# Patient Record
Sex: MALE | Race: WHITE | NOT HISPANIC OR LATINO | Employment: FULL TIME | URBAN - METROPOLITAN AREA
[De-identification: names, ages, dates, MRNs, and addresses within clinical notes are randomized per-mention and may not be internally consistent; named-entity substitution may affect disease eponyms.]

---

## 2018-12-13 ENCOUNTER — OFFICE VISIT (OUTPATIENT)
Dept: URGENT CARE | Facility: CLINIC | Age: 43
End: 2018-12-13
Payer: COMMERCIAL

## 2018-12-13 VITALS
TEMPERATURE: 98 F | OXYGEN SATURATION: 98 % | WEIGHT: 178 LBS | RESPIRATION RATE: 18 BRPM | HEART RATE: 75 BPM | HEIGHT: 69 IN | SYSTOLIC BLOOD PRESSURE: 142 MMHG | DIASTOLIC BLOOD PRESSURE: 83 MMHG | BODY MASS INDEX: 26.36 KG/M2

## 2018-12-13 DIAGNOSIS — R03.0 ELEVATED BP WITHOUT DIAGNOSIS OF HYPERTENSION: ICD-10-CM

## 2018-12-13 DIAGNOSIS — R09.82 POST-NASAL DRIP: ICD-10-CM

## 2018-12-13 DIAGNOSIS — J06.9 VIRAL URI WITH COUGH: Primary | ICD-10-CM

## 2018-12-13 DIAGNOSIS — S76.212A GROIN STRAIN, LEFT, INITIAL ENCOUNTER: ICD-10-CM

## 2018-12-13 DIAGNOSIS — Z72.0 TOBACCO ABUSE: ICD-10-CM

## 2018-12-13 PROCEDURE — 99203 OFFICE O/P NEW LOW 30 MIN: CPT | Mod: S$GLB,,, | Performed by: NURSE PRACTITIONER

## 2018-12-13 PROCEDURE — 3008F BODY MASS INDEX DOCD: CPT | Mod: CPTII,S$GLB,, | Performed by: NURSE PRACTITIONER

## 2018-12-13 RX ORDER — ETODOLAC 400 MG/1
400 TABLET, FILM COATED ORAL 2 TIMES DAILY
Qty: 30 TABLET | Refills: 0 | Status: SHIPPED | OUTPATIENT
Start: 2018-12-13 | End: 2018-12-28

## 2018-12-13 RX ORDER — AZELASTINE 1 MG/ML
2 SPRAY, METERED NASAL 2 TIMES DAILY
Qty: 30 ML | Refills: 0 | Status: SHIPPED | OUTPATIENT
Start: 2018-12-13 | End: 2019-04-01

## 2018-12-13 RX ORDER — PREDNISONE 20 MG/1
20 TABLET ORAL DAILY
Qty: 5 TABLET | Refills: 0 | Status: SHIPPED | OUTPATIENT
Start: 2018-12-13 | End: 2018-12-18

## 2018-12-13 RX ORDER — LORATADINE 10 MG/1
10 TABLET ORAL DAILY
Qty: 30 TABLET | Refills: 0 | Status: SHIPPED | OUTPATIENT
Start: 2018-12-13 | End: 2019-07-11

## 2018-12-13 NOTE — PATIENT INSTRUCTIONS
Please drink plenty of fluids.  Please get plenty of rest.  Please return here or go to the Emergency Department for any concerns or worsening of condition.  If you were prescribed antibiotics, please take them to completion.  If you were given wait & see antibiotics, please wait 5-7 days before taking them, and only take them if your symptoms have worsened or not improved.  If you do begin taking the antibiotics, please take them to completion.  If you were prescribed a narcotic medication, do not drive or operate heavy equipment or machinery while taking these medications.  If you were given a steroid shot in the clinic and have also been given a prescription for a steroid such as Prednisone or a Medrol Dose Pack, please begin taking them tomorrow.  If you do not have Hypertension or any history of palpitations, it is ok to take over the counter Sudafed or Mucinex D or Allegra-D or Claritin-D or Zyrtec-D.  If you do take one of the above, it is ok to combine that with plain over the counter Mucinex or Allegra or Claritin or Zyrtec.  If for example you are taking Zyrtec -D, you can combine that with Mucinex, but not Mucinex-D.  If you are taking Mucinex-D, you can combine that with plain Allegra or Claritin or Zyrtec.   If you do have Hypertension or palpitations, it is safe to take Coricidin HBP for relief of sinus symptoms.  If not allergic, please take over the counter Tylenol (Acetaminophen) and/or Motrin (Ibuprofen) as directed for control of pain and/or fever.  Please follow up with your primary care doctor or specialist as needed.    If you  smoke, please stop smoking.    Muscle Strain in the Extremities  A muscle strain is a stretching and tearing of muscle fibers. This causes pain, especially when you move that muscle. There may also be some swelling and bruising.  Home care  · Keep the hurt area raised to reduce pain and swelling. This is especially important during the first 48 hours.  · Apply an ice  pack over the injured area for 15 to 20 minutes every 3 to 6 hours. You should do this for the first 24 to 48 hours. You can make an ice pack by filling a plastic bag that seals at the top with ice cubes and then wrapping it with a thin towel. Be careful not to injure your skin with the ice treatments. Ice should never be applied directly to skin. Continue the use of ice packs for relief of pain and swelling as needed. After 48 hours, apply heat (warm shower or warm bath) for 15 to 20 minutes several times a day, or alternate ice and heat.  · You may use over-the-counter pain medicine to control pain, unless another medicine was prescribed. If you have chronic liver or kidney disease or ever had a stomach ulcer or GI bleeding, talk with your healthcare provider before using these medicines.  · For leg strains: If crutches have been recommended, dont put full weight on the hurt leg until you can do so without pain. You can return to sports when you are able to hop and run on the injured leg without pain.  Follow-up care  Follow up with your healthcare provider, or as advised.  When to seek medical advice  Call your healthcare provider right away if any of these occur:  · The toes of the injured leg become swollen, cold, blue, numb, or tingly  · Pain or swelling increases  Date Last Reviewed: 11/19/2015 © 2000-2017 [employer]. [address] [place] [address]. All rights reserved. This information is not intended as a substitute for professional medical care. Always follow your healthcare professional's instructions.        Viral Upper Respiratory Illness (Adult)  You have a viral upper respiratory illness (URI), which is another term for the common cold. This illness is contagious during the first few days. It is spread through the air by coughing and sneezing. It may also be spread by direct contact (touching the sick person and then touching your own eyes, nose, or mouth). Frequent  handwashing will decrease risk of spread. Most viral illnesses go away within 7 to 10 days with rest and simple home remedies. Sometimes the illness may last for several weeks. Antibiotics will not kill a virus, and they are generally not prescribed for this condition.    Home care  · If symptoms are severe, rest at home for the first 2 to 3 days. When you resume activity, don't let yourself get too tired.  · Avoid being exposed to cigarette smoke (yours or others).  · You may use acetaminophen or ibuprofen to control pain and fever, unless another medicine was prescribed. (Note: If you have chronic liver or kidney disease, have ever had a stomach ulcer or gastrointestinal bleeding, or are taking blood-thinning medicines, talk with your healthcare provider before using these medicines.) Aspirin should never be given to anyone under 18 years of age who is ill with a viral infection or fever. It may cause severe liver or brain damage.  · Your appetite may be poor, so a light diet is fine. Avoid dehydration by drinking 6 to 8 glasses of fluids per day (water, soft drinks, juices, tea, or soup). Extra fluids will help loosen secretions in the nose and lungs.  · Over-the-counter cold medicines will not shorten the length of time youre sick, but they may be helpful for the following symptoms: cough, sore throat, and nasal and sinus congestion. (Note: Do not use decongestants if you have high blood pressure.)  Follow-up care  Follow up with your healthcare provider, or as advised.  When to seek medical advice  Call your healthcare provider right away if any of these occur:  · Cough with lots of colored sputum (mucus)  · Severe headache; face, neck, or ear pain  · Difficulty swallowing due to throat pain  · Fever of 100.4°F (38°C)  Call 911, or get immediate medical care  Call emergency services right away if any of these occur:  · Chest pain, shortness of breath, wheezing, or difficulty breathing  · Coughing up  blood  · Inability to swallow due to throat pain  Date Last Reviewed: 9/13/2015  © 5824-8681 [employer]. [address] [place] [address], [place] [address]. All rights reserved. This information is not intended as a substitute for professional medical care. Always follow your healthcare professional's instructions.

## 2018-12-13 NOTE — PROGRESS NOTES
"Subjective:       Patient ID: Raoul Logan is a 43 y.o. male.    Vitals:  height is 5' 9" (1.753 m) and weight is 80.7 kg (178 lb). His temperature is 97.7 °F (36.5 °C). His blood pressure is 142/83 (abnormal) and his pulse is 75. His respiration is 18 and oxygen saturation is 98%.     Chief Complaint: URI    Pt also C/o having Lt hip pain * 3 weeks limited ROM.  Patient has been having URI symptoms for the last few days.  Has not taken anything for his hip or his URI symptoms.  Mother is also ill with the same thing.      URI    This is a new problem. The current episode started in the past 7 days (4 days). The problem has been unchanged. There has been no fever. Associated symptoms include congestion, coughing, ear pain, a plugged ear sensation, rhinorrhea and sinus pain. Pertinent negatives include no nausea, rash, sore throat, vomiting or wheezing. He has tried nothing for the symptoms. The treatment provided no relief.       Constitution: Negative for chills, sweating, fatigue and fever.   HENT: Positive for ear pain, tinnitus, congestion, sinus pain and sinus pressure. Negative for sore throat and voice change.    Neck: Negative for painful lymph nodes.   Eyes: Negative for eye redness.   Respiratory: Positive for cough and sputum production. Negative for chest tightness, bloody sputum, COPD, shortness of breath, stridor, wheezing and asthma.    Gastrointestinal: Negative for nausea and vomiting.   Musculoskeletal: Positive for joint pain and abnormal ROM of joint. Negative for muscle ache.   Skin: Negative for rash.   Allergic/Immunologic: Negative for seasonal allergies and asthma.   Hematologic/Lymphatic: Negative for swollen lymph nodes.       Objective:      Physical Exam   Constitutional: He is oriented to person, place, and time. He appears well-developed and well-nourished. He is cooperative.  Non-toxic appearance. He does not have a sickly appearance. He does not appear ill. No distress. "   Elevated BP   HENT:   Head: Normocephalic and atraumatic.   Right Ear: Hearing, external ear and ear canal normal. A middle ear effusion is present.   Left Ear: Hearing, external ear and ear canal normal. A middle ear effusion is present.   Nose: Mucosal edema and rhinorrhea present. No nasal deformity. No epistaxis. Right sinus exhibits no maxillary sinus tenderness and no frontal sinus tenderness. Left sinus exhibits no maxillary sinus tenderness and no frontal sinus tenderness.   Mouth/Throat: Uvula is midline and mucous membranes are normal. No trismus in the jaw. Normal dentition. No uvula swelling. Posterior oropharyngeal erythema present. Tonsils are 2+ on the right. Tonsils are 2+ on the left. No tonsillar exudate.   Eyes: EOM and lids are normal. Pupils are equal, round, and reactive to light. Right conjunctiva is injected. Left conjunctiva is injected. No scleral icterus.   Sclera clear bilat   Neck: Trachea normal, normal range of motion, full passive range of motion without pain and phonation normal. Neck supple. No spinous process tenderness and no muscular tenderness present. No neck rigidity. Normal range of motion present.   Cardiovascular: Normal rate, regular rhythm, normal heart sounds and normal pulses.   Pulmonary/Chest: Effort normal and breath sounds normal. No respiratory distress.   Abdominal: Normal appearance.   Musculoskeletal: He exhibits tenderness. He exhibits no edema or deformity.        Left hip: He exhibits tenderness. He exhibits normal range of motion and normal strength.        Left knee: Normal.        Left upper leg: Normal.        Legs:  Lymphadenopathy:     He has cervical adenopathy.   Neurological: He is alert and oriented to person, place, and time. He exhibits normal muscle tone. Coordination normal.   Skin: Skin is warm, dry and intact. Capillary refill takes less than 2 seconds. He is not diaphoretic. No pallor.   Psychiatric: He has a normal mood and affect. His  speech is normal and behavior is normal. Judgment and thought content normal. Cognition and memory are normal.   Nursing note and vitals reviewed.      Assessment:       1. Viral URI with cough    2. Groin strain, left, initial encounter    3. Post-nasal drip    4. Tobacco abuse    5. Elevated BP without diagnosis of hypertension        Plan:         Viral URI with cough  -     loratadine (CLARITIN) 10 mg tablet; Take 1 tablet (10 mg total) by mouth once daily.  Dispense: 30 tablet; Refill: 0  -     azelastine (ASTELIN) 137 mcg (0.1 %) nasal spray; 2 sprays (274 mcg total) by Nasal route 2 (two) times daily.  Dispense: 30 mL; Refill: 0  -     predniSONE (DELTASONE) 20 MG tablet; Take 1 tablet (20 mg total) by mouth once daily. for 5 days  Dispense: 5 tablet; Refill: 0    Groin strain, left, initial encounter  -     etodolac (LODINE) 400 MG tablet; Take 1 tablet (400 mg total) by mouth 2 (two) times daily. for 15 days  Dispense: 30 tablet; Refill: 0    Post-nasal drip  -     loratadine (CLARITIN) 10 mg tablet; Take 1 tablet (10 mg total) by mouth once daily.  Dispense: 30 tablet; Refill: 0  -     azelastine (ASTELIN) 137 mcg (0.1 %) nasal spray; 2 sprays (274 mcg total) by Nasal route 2 (two) times daily.  Dispense: 30 mL; Refill: 0  -     predniSONE (DELTASONE) 20 MG tablet; Take 1 tablet (20 mg total) by mouth once daily. for 5 days  Dispense: 5 tablet; Refill: 0    Tobacco abuse  -     Ambulatory referral to Smoking Cessation Program    Elevated BP without diagnosis of hypertension          Patient Instructions     Please drink plenty of fluids.  Please get plenty of rest.  Please return here or go to the Emergency Department for any concerns or worsening of condition.  If you were prescribed antibiotics, please take them to completion.  If you were given wait & see antibiotics, please wait 5-7 days before taking them, and only take them if your symptoms have worsened or not improved.  If you do begin taking the  antibiotics, please take them to completion.  If you were prescribed a narcotic medication, do not drive or operate heavy equipment or machinery while taking these medications.  If you were given a steroid shot in the clinic and have also been given a prescription for a steroid such as Prednisone or a Medrol Dose Pack, please begin taking them tomorrow.  If you do not have Hypertension or any history of palpitations, it is ok to take over the counter Sudafed or Mucinex D or Allegra-D or Claritin-D or Zyrtec-D.  If you do take one of the above, it is ok to combine that with plain over the counter Mucinex or Allegra or Claritin or Zyrtec.  If for example you are taking Zyrtec -D, you can combine that with Mucinex, but not Mucinex-D.  If you are taking Mucinex-D, you can combine that with plain Allegra or Claritin or Zyrtec.   If you do have Hypertension or palpitations, it is safe to take Coricidin HBP for relief of sinus symptoms.  If not allergic, please take over the counter Tylenol (Acetaminophen) and/or Motrin (Ibuprofen) as directed for control of pain and/or fever.  Please follow up with your primary care doctor or specialist as needed.    If you  smoke, please stop smoking.    Muscle Strain in the Extremities  A muscle strain is a stretching and tearing of muscle fibers. This causes pain, especially when you move that muscle. There may also be some swelling and bruising.  Home care  · Keep the hurt area raised to reduce pain and swelling. This is especially important during the first 48 hours.  · Apply an ice pack over the injured area for 15 to 20 minutes every 3 to 6 hours. You should do this for the first 24 to 48 hours. You can make an ice pack by filling a plastic bag that seals at the top with ice cubes and then wrapping it with a thin towel. Be careful not to injure your skin with the ice treatments. Ice should never be applied directly to skin. Continue the use of ice packs for relief of pain and  swelling as needed. After 48 hours, apply heat (warm shower or warm bath) for 15 to 20 minutes several times a day, or alternate ice and heat.  · You may use over-the-counter pain medicine to control pain, unless another medicine was prescribed. If you have chronic liver or kidney disease or ever had a stomach ulcer or GI bleeding, talk with your healthcare provider before using these medicines.  · For leg strains: If crutches have been recommended, dont put full weight on the hurt leg until you can do so without pain. You can return to sports when you are able to hop and run on the injured leg without pain.  Follow-up care  Follow up with your healthcare provider, or as advised.  When to seek medical advice  Call your healthcare provider right away if any of these occur:  · The toes of the injured leg become swollen, cold, blue, numb, or tingly  · Pain or swelling increases  Date Last Reviewed: 11/19/2015  © 0988-2902 [employer]. [address] [place] [address]. All rights reserved. This information is not intended as a substitute for professional medical care. Always follow your healthcare professional's instructions.        Viral Upper Respiratory Illness (Adult)  You have a viral upper respiratory illness (URI), which is another term for the common cold. This illness is contagious during the first few days. It is spread through the air by coughing and sneezing. It may also be spread by direct contact (touching the sick person and then touching your own eyes, nose, or mouth). Frequent handwashing will decrease risk of spread. Most viral illnesses go away within 7 to 10 days with rest and simple home remedies. Sometimes the illness may last for several weeks. Antibiotics will not kill a virus, and they are generally not prescribed for this condition.    Home care  · If symptoms are severe, rest at home for the first 2 to 3 days. When you resume activity, don't let yourself get too  tired.  · Avoid being exposed to cigarette smoke (yours or others).  · You may use acetaminophen or ibuprofen to control pain and fever, unless another medicine was prescribed. (Note: If you have chronic liver or kidney disease, have ever had a stomach ulcer or gastrointestinal bleeding, or are taking blood-thinning medicines, talk with your healthcare provider before using these medicines.) Aspirin should never be given to anyone under 18 years of age who is ill with a viral infection or fever. It may cause severe liver or brain damage.  · Your appetite may be poor, so a light diet is fine. Avoid dehydration by drinking 6 to 8 glasses of fluids per day (water, soft drinks, juices, tea, or soup). Extra fluids will help loosen secretions in the nose and lungs.  · Over-the-counter cold medicines will not shorten the length of time youre sick, but they may be helpful for the following symptoms: cough, sore throat, and nasal and sinus congestion. (Note: Do not use decongestants if you have high blood pressure.)  Follow-up care  Follow up with your healthcare provider, or as advised.  When to seek medical advice  Call your healthcare provider right away if any of these occur:  · Cough with lots of colored sputum (mucus)  · Severe headache; face, neck, or ear pain  · Difficulty swallowing due to throat pain  · Fever of 100.4°F (38°C)  Call 911, or get immediate medical care  Call emergency services right away if any of these occur:  · Chest pain, shortness of breath, wheezing, or difficulty breathing  · Coughing up blood  · Inability to swallow due to throat pain  Date Last Reviewed: 9/13/2015 © 2000-2017 [employer]. [address] [place] [address], [place] [address]. All rights reserved. This information is not intended as a substitute for professional medical care. Always follow your healthcare professional's instructions.

## 2019-01-11 ENCOUNTER — HOSPITAL ENCOUNTER (EMERGENCY)
Facility: HOSPITAL | Age: 44
Discharge: HOME | End: 2019-01-12
Attending: EMERGENCY MEDICINE
Payer: COMMERCIAL

## 2019-01-11 DIAGNOSIS — R52 PAIN: ICD-10-CM

## 2019-01-11 DIAGNOSIS — M25.552 LEFT HIP PAIN: Primary | ICD-10-CM

## 2019-01-11 LAB
ALBUMIN SERPL BCP-MCNC: 4.2 G/DL
ALP SERPL-CCNC: 104 U/L
ALT SERPL W/O P-5'-P-CCNC: 38 U/L
ANION GAP SERPL CALC-SCNC: 11 MMOL/L
AST SERPL-CCNC: 28 U/L
BASOPHILS # BLD AUTO: 0.01 K/UL
BASOPHILS NFR BLD: 0.1 %
BILIRUB SERPL-MCNC: 0.5 MG/DL
BILIRUB UR QL STRIP: NEGATIVE
BUN SERPL-MCNC: 20 MG/DL
CALCIUM SERPL-MCNC: 9.7 MG/DL
CHLORIDE SERPL-SCNC: 106 MMOL/L
CLARITY UR: CLEAR
CO2 SERPL-SCNC: 21 MMOL/L
COLOR UR: YELLOW
CREAT SERPL-MCNC: 0.9 MG/DL
DIFFERENTIAL METHOD: NORMAL
EOSINOPHIL # BLD AUTO: 0.3 K/UL
EOSINOPHIL NFR BLD: 3.6 %
ERYTHROCYTE [DISTWIDTH] IN BLOOD BY AUTOMATED COUNT: 13.8 %
EST. GFR  (AFRICAN AMERICAN): >60 ML/MIN/1.73 M^2
EST. GFR  (NON AFRICAN AMERICAN): >60 ML/MIN/1.73 M^2
GLUCOSE SERPL-MCNC: 87 MG/DL
GLUCOSE UR QL STRIP: NEGATIVE
HCT VFR BLD AUTO: 42.9 %
HGB BLD-MCNC: 14 G/DL
HGB UR QL STRIP: ABNORMAL
KETONES UR QL STRIP: NEGATIVE
LEUKOCYTE ESTERASE UR QL STRIP: NEGATIVE
LYMPHOCYTES # BLD AUTO: 2.3 K/UL
LYMPHOCYTES NFR BLD: 28.7 %
MCH RBC QN AUTO: 28.5 PG
MCHC RBC AUTO-ENTMCNC: 32.6 G/DL
MCV RBC AUTO: 87 FL
MONOCYTES # BLD AUTO: 0.5 K/UL
MONOCYTES NFR BLD: 6.4 %
NEUTROPHILS # BLD AUTO: 4.9 K/UL
NEUTROPHILS NFR BLD: 60.8 %
NITRITE UR QL STRIP: NEGATIVE
PH UR STRIP: 6 [PH] (ref 5–8)
PLATELET # BLD AUTO: 219 K/UL
PMV BLD AUTO: 10.2 FL
POTASSIUM SERPL-SCNC: 3.9 MMOL/L
PROT SERPL-MCNC: 7.9 G/DL
PROT UR QL STRIP: NEGATIVE
RBC # BLD AUTO: 4.92 M/UL
SODIUM SERPL-SCNC: 138 MMOL/L
SP GR UR STRIP: 1.01 (ref 1–1.03)
URN SPEC COLLECT METH UR: ABNORMAL
UROBILINOGEN UR STRIP-ACNC: NEGATIVE EU/DL
WBC # BLD AUTO: 8.01 K/UL

## 2019-01-11 PROCEDURE — 81003 URINALYSIS AUTO W/O SCOPE: CPT

## 2019-01-11 PROCEDURE — 96372 THER/PROPH/DIAG INJ SC/IM: CPT | Mod: 59

## 2019-01-11 PROCEDURE — 85025 COMPLETE CBC W/AUTO DIFF WBC: CPT

## 2019-01-11 PROCEDURE — 99284 EMERGENCY DEPT VISIT MOD MDM: CPT | Mod: 25

## 2019-01-11 PROCEDURE — 80053 COMPREHEN METABOLIC PANEL: CPT

## 2019-01-11 RX ADMIN — IOHEXOL 75 ML: 350 INJECTION, SOLUTION INTRAVENOUS at 11:01

## 2019-01-12 VITALS
RESPIRATION RATE: 16 BRPM | HEART RATE: 79 BPM | BODY MASS INDEX: 26.66 KG/M2 | HEIGHT: 69 IN | SYSTOLIC BLOOD PRESSURE: 141 MMHG | TEMPERATURE: 98 F | OXYGEN SATURATION: 100 % | DIASTOLIC BLOOD PRESSURE: 97 MMHG | WEIGHT: 180 LBS

## 2019-01-12 PROCEDURE — 63600175 PHARM REV CODE 636 W HCPCS: Performed by: EMERGENCY MEDICINE

## 2019-01-12 PROCEDURE — 25500020 PHARM REV CODE 255: Performed by: EMERGENCY MEDICINE

## 2019-01-12 RX ORDER — DEXAMETHASONE SODIUM PHOSPHATE 4 MG/ML
8 INJECTION, SOLUTION INTRA-ARTICULAR; INTRALESIONAL; INTRAMUSCULAR; INTRAVENOUS; SOFT TISSUE
Status: COMPLETED | OUTPATIENT
Start: 2019-01-12 | End: 2019-01-12

## 2019-01-12 RX ORDER — METHOCARBAMOL 500 MG/1
1000 TABLET, FILM COATED ORAL 3 TIMES DAILY
Qty: 30 TABLET | Refills: 0 | Status: SHIPPED | OUTPATIENT
Start: 2019-01-12 | End: 2019-01-17

## 2019-01-12 RX ORDER — NAPROXEN 500 MG/1
500 TABLET ORAL 2 TIMES DAILY WITH MEALS
Qty: 30 TABLET | Refills: 0 | Status: SHIPPED | OUTPATIENT
Start: 2019-01-12 | End: 2019-03-11

## 2019-01-12 RX ADMIN — DEXAMETHASONE SODIUM PHOSPHATE 8 MG: 4 INJECTION, SOLUTION INTRA-ARTICULAR; INTRALESIONAL; INTRAMUSCULAR; INTRAVENOUS; SOFT TISSUE at 01:01

## 2019-01-12 NOTE — ED TRIAGE NOTES
Patient presents to the ED with reports of having left sided hip pain that radiates to the groin. States pain started approximately x 3 months ago. States pain is intermittent. Denies any fall or trauma. Patient also complains of having intermittent bouts of rectal bleeding with diarrhea that started x 1 year ago. Patient states having bleeding x 3 days. Denies any nausea, vomiting, fever, or abdominal pain.     Review of patient's allergies indicates:   Allergen Reactions    Pcn [penicillins] Anaphylaxis        Patient has verified the spelling of their name and  on armband.   APPEARANCE: Patient is alert, calm, oriented x 4, and does not appear distressed.  SKIN: Skin is normal for race, warm, and dry. Normal skin turgor and mucous membranes moist.  CARDIAC: Normal rate and no murmur heard.   RESPIRATORY:Normal rate and effort. Breath sounds clear bilaterally throughout chest. Respirations are equal and unlabored.    GASTRO: Bowel sounds normal, abdomen is soft, no tenderness, and no abdominal distention. +Rectal bleeding.   MUSCLE: Full ROM. Left sided hip pain that radiates to the left inner groin area. No obvious deformity.  PERIPHERAL VASCULAR: peripheral pulses present. Normal cap refill. No edema. Warm to touch.  MENTAL STATUS: awake, alert and aware of environment.  : WNL

## 2019-01-12 NOTE — ED PROVIDER NOTES
"Encounter Date: 1/11/2019    SCRIBE #1 NOTE: I, Debbi Laguna, am scribing for, and in the presence of,  Dr. Eid. I have scribed the entire note.       History     Chief Complaint   Patient presents with    Hip Pain     left side and radiating to right side; x3 months; was seen at urgent care and given anti inflammatory with no relief    Rectal Bleeding     x2 days; BRB; accompanied by diarrhea; x1 year intermittent; happens "every 3 months or so"     Raoul Logan is a 43 y.o. male who  has no past medical history on file.    The patient presents to the ED due to left hip pain. He reports onset of symptoms was 3 months ago. The patient states the pain radiates to the groin. He notes about 3 weeks ago he was seen at [facility] for the pain and diagnosed with "groin pull". The patient was given antiinflammatories which have not improved the pain. He also notes he is starting to have pain in the right hip that he attributes to favoring right leg when walking. The patient denies any precipitating trauma or injury to the hip. He denies any associated swelling, numbness, tingling or weakness in the left leg.   The patient also complains of rectal bleeding. He reports onset of symptoms was about 1 year ago. The patient notes the symptoms have been intermittent since onset. He states he usually has rectal bleeding with diarrhea every 3 months. With his most recent episode of symptoms beginning 2 days ago. The patient reports the blood is bright red and mixed with loose stool. He denies any associated rectal pain or abdominal pain. The patient denies any prior GI evaluations for symptoms.       The history is provided by the patient.     Review of patient's allergies indicates:   Allergen Reactions    Pcn [penicillins] Anaphylaxis     History reviewed. No pertinent past medical history.  History reviewed. No pertinent surgical history.  Family History   Problem Relation Age of Onset    Cancer Mother     No Known " Problems Father      Social History     Tobacco Use    Smoking status: Current Every Day Smoker    Smokeless tobacco: Never Used   Substance Use Topics    Alcohol use: No     Frequency: Never    Drug use: No     Review of Systems   Constitutional: Negative for chills and fever.   HENT: Negative for congestion, ear pain, rhinorrhea and sore throat.    Respiratory: Negative for cough, shortness of breath and wheezing.    Cardiovascular: Negative for chest pain and palpitations.   Gastrointestinal: Positive for anal bleeding. Negative for abdominal pain, diarrhea, nausea and vomiting.   Genitourinary: Negative for dysuria and hematuria.   Musculoskeletal: Negative for back pain, myalgias and neck pain.        Positive for left hip pain   Skin: Negative for rash.   Neurological: Negative for dizziness, weakness, light-headedness and headaches.   Psychiatric/Behavioral: Negative for confusion.       Physical Exam     Initial Vitals [01/11/19 2157]   BP Pulse Resp Temp SpO2   (!) 144/91 81 16 97 °F (36.1 °C) 100 %      MAP       --         Physical Exam    Nursing note and vitals reviewed.  Constitutional: He appears well-developed and well-nourished. He is not diaphoretic. No distress.   HENT:   Head: Normocephalic and atraumatic.   Mouth/Throat: Oropharynx is clear and moist.   Eyes: Conjunctivae and EOM are normal.   Neck: Normal range of motion. Neck supple.   Cardiovascular: Normal rate, regular rhythm and normal heart sounds. Exam reveals no gallop and no friction rub.    No murmur heard.  Pulmonary/Chest: Breath sounds normal. He has no wheezes. He has no rhonchi. He has no rales.   Abdominal: Soft. There is no tenderness. There is no rebound and no guarding.   Genitourinary:   Genitourinary Comments: Tenderness to left groin. No inguinal adenopathy. No palpable inguinal hernia. No erythema to the groin   Musculoskeletal: Normal range of motion. He exhibits tenderness. He exhibits no edema.   Lymphadenopathy:      He has no cervical adenopathy.   Neurological: He is alert and oriented to person, place, and time. He has normal strength.   Skin: Skin is warm and dry. No rash noted.         ED Course   Procedures  Labs Reviewed   COMPREHENSIVE METABOLIC PANEL - Abnormal; Notable for the following components:       Result Value    CO2 21 (*)     All other components within normal limits   URINALYSIS, REFLEX TO URINE CULTURE - Abnormal; Notable for the following components:    Occult Blood UA Trace (*)     All other components within normal limits    Narrative:     Preferred Collection Type->Urine, Clean Catch   CBC W/ AUTO DIFFERENTIAL          Imaging Results          CT Abdomen Pelvis With Contrast (Final result)  Result time 01/12/19 00:08:07    Final result by Paola Amaral MD (01/12/19 00:08:07)                 Impression:      2.2 cm low-attenuation left adrenal cyst lesion, which is indeterminate.  If warranted, consider MRI imaging with inphase and out of phase sequencing.    L5/S1 spondylolysis and spondylolisthesis.      Electronically signed by: Paola Amaral  Date:    01/12/2019  Time:    00:08             Narrative:    EXAMINATION:  CT ABDOMEN PELVIS WITH CONTRAST    CLINICAL HISTORY:  LLQ pain, suspect diverticulitis;    TECHNIQUE:  Low dose axial images, sagittal and coronal reformations were obtained from the lung bases to the pubic symphysis following the IV administration of 75 mL of Omnipaque 350 and the oral administration of 30 mL of Omnipaque 350.    COMPARISON:  None.    FINDINGS:  The liver, spleen, pancreas, right adrenal gland, kidneys, and gallbladder are unremarkable.    There is a 2.2 cm low-attenuation left adrenal lesion.    There is no gross abdominal adenopathy or ascites.    There is no evidence of small-bowel obstruction.    There is a small fat containing umbilical hernia.    In the pelvis, the appendix is unremarkable.  There is no free fluid in the pelvis.  The prostate gland is  not enlarged.  There is no gross pelvic adenopathy.  There is a spondylolysis and spondylolisthesis at L5/S1.  There is no evidence for acute diverticulitis.    At the lung bases, the lungs are clear.                               US Lower Extremity Veins Left (Final result)  Result time 01/11/19 22:57:47   Procedure changed from US Lower Extremity Veins Right     Final result by Petros Mosquera MD (01/11/19 22:57:47)                 Impression:      No evidence of deep venous thrombosis in the left lower extremity.      Electronically signed by: Petros Mosquera MD  Date:    01/11/2019  Time:    22:57             Narrative:    EXAMINATION:  US LOWER EXTREMITY VEINS LEFT    CLINICAL HISTORY:  pain; Pain, unspecified    TECHNIQUE:  Duplex and color flow Doppler evaluation and graded compression of the left lower extremity veins was performed.    COMPARISON:  None    FINDINGS:  Left thigh veins: The common femoral, femoral, popliteal, upper greater saphenous, and deep femoral veins are patent and free of thrombus. The veins are normally compressible and have normal phasic flow and augmentation response.    Left calf veins: The visualized calf veins are patent.    Contralateral CFV: The contralateral (right) common femoral vein is patent and free of thrombus.    Miscellaneous: None                                 Medical Decision Making:   ED Management:  Pt has spondylosis on his ct. Possibly suffering from radicular pain from lumbar disc disease. Pt was advised to f/u w gi for bloody stools and primary care as well as nsg for eval of hip pain              Attending Attestation:           Physician Attestation for Scribe:  Physician Attestation Statement for Scribe #1: I, jose mims, reviewed documentation, as scribed by nettie jordan in my presence, and it is both accurate and complete.                    Clinical Impression:     1. Left hip pain    2. Pain        Disposition:   Disposition:  Discharged  Condition: Stable                        Chi Eid MD  01/12/19 0126

## 2019-03-11 ENCOUNTER — OFFICE VISIT (OUTPATIENT)
Dept: FAMILY MEDICINE | Facility: CLINIC | Age: 44
End: 2019-03-11
Payer: COMMERCIAL

## 2019-03-11 VITALS
HEART RATE: 75 BPM | OXYGEN SATURATION: 97 % | BODY MASS INDEX: 27.35 KG/M2 | WEIGHT: 185.19 LBS | TEMPERATURE: 98 F | DIASTOLIC BLOOD PRESSURE: 77 MMHG | SYSTOLIC BLOOD PRESSURE: 109 MMHG

## 2019-03-11 DIAGNOSIS — M70.62 GREATER TROCHANTERIC BURSITIS OF LEFT HIP: Primary | ICD-10-CM

## 2019-03-11 PROCEDURE — 99999 PR PBB SHADOW E&M-EST. PATIENT-LVL III: CPT | Mod: PBBFAC,,, | Performed by: FAMILY MEDICINE

## 2019-03-11 PROCEDURE — 99999 PR PBB SHADOW E&M-EST. PATIENT-LVL III: ICD-10-PCS | Mod: PBBFAC,,, | Performed by: FAMILY MEDICINE

## 2019-03-11 PROCEDURE — 99203 OFFICE O/P NEW LOW 30 MIN: CPT | Mod: S$GLB,,, | Performed by: FAMILY MEDICINE

## 2019-03-11 PROCEDURE — 99203 PR OFFICE/OUTPT VISIT, NEW, LEVL III, 30-44 MIN: ICD-10-PCS | Mod: S$GLB,,, | Performed by: FAMILY MEDICINE

## 2019-03-11 PROCEDURE — 3008F PR BODY MASS INDEX (BMI) DOCUMENTED: ICD-10-PCS | Mod: CPTII,S$GLB,, | Performed by: FAMILY MEDICINE

## 2019-03-11 PROCEDURE — 3008F BODY MASS INDEX DOCD: CPT | Mod: CPTII,S$GLB,, | Performed by: FAMILY MEDICINE

## 2019-03-11 RX ORDER — DICLOFENAC SODIUM 75 MG/1
75 TABLET, DELAYED RELEASE ORAL 2 TIMES DAILY
Qty: 60 TABLET | Refills: 1 | Status: SHIPPED | OUTPATIENT
Start: 2019-03-11 | End: 2019-04-04

## 2019-03-13 ENCOUNTER — TELEPHONE (OUTPATIENT)
Dept: FAMILY MEDICINE | Facility: CLINIC | Age: 44
End: 2019-03-13

## 2019-03-13 DIAGNOSIS — M87.051 AVASCULAR NECROSIS OF BONE OF RIGHT HIP: ICD-10-CM

## 2019-03-13 DIAGNOSIS — M87.052 AVASCULAR NECROSIS OF BONE OF HIP, LEFT: ICD-10-CM

## 2019-03-19 ENCOUNTER — TELEPHONE (OUTPATIENT)
Dept: FAMILY MEDICINE | Facility: CLINIC | Age: 44
End: 2019-03-19

## 2019-03-25 ENCOUNTER — OFFICE VISIT (OUTPATIENT)
Dept: PAIN MEDICINE | Facility: CLINIC | Age: 44
End: 2019-03-25
Payer: COMMERCIAL

## 2019-03-25 VITALS
HEIGHT: 69 IN | BODY MASS INDEX: 25.61 KG/M2 | RESPIRATION RATE: 18 BRPM | DIASTOLIC BLOOD PRESSURE: 91 MMHG | SYSTOLIC BLOOD PRESSURE: 132 MMHG | HEART RATE: 72 BPM | OXYGEN SATURATION: 98 % | WEIGHT: 172.88 LBS

## 2019-03-25 DIAGNOSIS — M87.059 AVASCULAR NECROSIS OF BONE OF HIP, UNSPECIFIED LATERALITY: ICD-10-CM

## 2019-03-25 DIAGNOSIS — M25.552 BILATERAL HIP PAIN: Primary | ICD-10-CM

## 2019-03-25 DIAGNOSIS — M87.051 AVASCULAR NECROSIS OF BONE OF RIGHT HIP: ICD-10-CM

## 2019-03-25 DIAGNOSIS — M25.551 BILATERAL HIP PAIN: Primary | ICD-10-CM

## 2019-03-25 DIAGNOSIS — M87.052 AVASCULAR NECROSIS OF BONE OF HIP, LEFT: ICD-10-CM

## 2019-03-25 PROCEDURE — 99999 PR PBB SHADOW E&M-EST. PATIENT-LVL III: CPT | Mod: PBBFAC,,, | Performed by: PAIN MEDICINE

## 2019-03-25 PROCEDURE — 99999 PR PBB SHADOW E&M-EST. PATIENT-LVL III: ICD-10-PCS | Mod: PBBFAC,,, | Performed by: PAIN MEDICINE

## 2019-03-25 PROCEDURE — 99244 OFF/OP CNSLTJ NEW/EST MOD 40: CPT | Mod: S$GLB,,, | Performed by: PAIN MEDICINE

## 2019-03-25 PROCEDURE — 99244 PR OFFICE CONSULTATION,LEVEL IV: ICD-10-PCS | Mod: S$GLB,,, | Performed by: PAIN MEDICINE

## 2019-03-25 NOTE — LETTER
March 25, 2019      José Miguel Carroll MD  [address]  [place] [address]           [facility]  [address]  [place] [address]  Phone: [phone]  Fax: [phone]          Patient: Raoul Logan   MR Number: [id number]   YOB: 1975   Date of Visit: 3/25/2019       Dear Dr. José Miguel LAMBERT Page:    Thank you for referring Raoul Logan to me for evaluation. Attached you will find relevant portions of my assessment and plan of care.    If you have questions, please do not hesitate to call me. I look forward to following Raoul Logan along with you.    Sincerely,    Paul Manzo Jr., MD    Enclosure  CC:  No Recipients    If you would like to receive this communication electronically, please contact [email or web address] or [phone] to request more information on [employer] Link access.    For providers and/or their staff who would like to refer a patient to [facility], please contact us through our one-stop-shop provider referral line, [facility], at [phone].    If you feel you have received this communication in error or would no longer like to receive these types of communications, please e-mail [email or web address]

## 2019-03-25 NOTE — PROGRESS NOTES
Subjective:     Patient ID: Raoul Logan is a 43 y.o. male    Chief Complaint: Hip Pain (patient experiences pain due to greater trochanteric bursitis of left hip- dx w/ avascular necrosis of bone of hip Left & Right- patient experiences sharp and shooting  pain- radiates down to L knee- treatment w/ medication)      Referred by: José Miguel Carroll MD      HPI:    Initial Encounter (3/25/19):  Raoul Logan is a 43 y.o. male who presents today with chronic left hip pain. This pain has been present since October of 2018.  No specific inciting event or injury noted. He denies any right-sided pain. He states the pain is worse towards the end of the day.  He denies significant pain when he wakes.  Pain is located in the left posterior hip region radiates to the left groin.  He does report some slight numbness in the left lateral thigh.  He denies any associated weakness or bowel bladder dysfunction.  Patient denies any chronic use of corticosteroids in the past.  This pain is described in detail below.    Physical Therapy:  No.    Non-pharmacologic Treatment:  Rest helps         · TENS?  No    Pain Medications:         · Currently taking:  Voltaren    · Has tried in the past:  OTC NSAIDs    · Has not tried:  Opioids, Tylenol, Muscle relaxants, TCAs, SNRIs, anticonvulsants, topical creams    Blood thinners:  None    Interventional Therapies:  None    Relevant Surgeries:  None    Affecting sleep?  Yes    Affecting daily activities? yes    Depressive symptoms? no          · SI/HI? No    Work status: Employed    Pain Scores:    Best:       2/10  Worst:     9/10  Usually:   8/10  Today:    4/10    Review of Systems   Constitutional: Negative for activity change, appetite change, chills, fatigue, fever and unexpected weight change.   HENT: Negative for hearing loss.    Eyes: Negative for visual disturbance.   Respiratory: Negative for chest tightness and shortness of breath.    Cardiovascular: Negative for chest  pain.   Gastrointestinal: Negative for abdominal pain, constipation, diarrhea, nausea and vomiting.   Genitourinary: Negative for difficulty urinating.   Musculoskeletal: Positive for arthralgias, gait problem and myalgias. Negative for back pain and neck pain.   Skin: Negative for rash.   Neurological: Positive for numbness. Negative for dizziness, weakness, light-headedness and headaches.   Psychiatric/Behavioral: Positive for sleep disturbance. Negative for hallucinations and suicidal ideas. The patient is not nervous/anxious.        History reviewed. No pertinent past medical history.    History reviewed. No pertinent surgical history.    Social History     Socioeconomic History    Marital status: Single     Spouse name: Not on file    Number of children: Not on file    Years of education: Not on file    Highest education level: Not on file   Occupational History    Not on file   Social Needs    Financial resource strain: Not on file    Food insecurity:     Worry: Not on file     Inability: Not on file    Transportation needs:     Medical: Not on file     Non-medical: Not on file   Tobacco Use    Smoking status: Current Every Day Smoker    Smokeless tobacco: Never Used   Substance and Sexual Activity    Alcohol use: No     Frequency: Never    Drug use: No    Sexual activity: Yes     Partners: Female   Lifestyle    Physical activity:     Days per week: Not on file     Minutes per session: Not on file    Stress: Not on file   Relationships    Social connections:     Talks on phone: Not on file     Gets together: Not on file     Attends [employer] service: Not on file     Active member of club or organization: Not on file     Attends meetings of clubs or organizations: Not on file     Relationship status: Not on file    Intimate partner violence:     Fear of current or ex partner: Not on file     Emotionally abused: Not on file     Physically abused: Not on file     Forced sexual activity: Not on  "file   Other Topics Concern    Not on file   Social History Narrative    Not on file       Review of patient's allergies indicates:   Allergen Reactions    Pcn [penicillins] Anaphylaxis       Current Outpatient Medications on File Prior to Visit   Medication Sig Dispense Refill    azelastine (ASTELIN) 137 mcg (0.1 %) nasal spray 2 sprays (274 mcg total) by Nasal route 2 (two) times daily. 30 mL 0    diclofenac (VOLTAREN) 75 MG EC tablet Take 1 tablet (75 mg total) by mouth 2 (two) times daily. 60 tablet 1    loratadine (CLARITIN) 10 mg tablet Take 1 tablet (10 mg total) by mouth once daily. 30 tablet 0     No current facility-administered medications on file prior to visit.        Objective:      BP (!) 132/91   Pulse 72   Resp 18   Ht 5' 9" (1.753 m)   Wt 78.4 kg (172 lb 14.4 oz)   SpO2 98%   BMI 25.53 kg/m²     Exam:  GEN:  Well developed, well nourished.  No acute distress.  Normal pain behavior.  HEENT:  No trauma.  Mucous membranes moist.  Nares patent bilaterally.  PSYCH: Normal affect. Thought content appropriate.  CHEST:  Breathing symmetric.  No audible wheezing.  ABD: Soft, non-distended.  SKIN:  Warm, pink, dry.  No rash on exposed areas.    EXT:  No cyanosis, clubbing, or edema.  No color change or changes in nail or hair growth.  NEURO/MUSCULOSKELETAL:  Fully alert, oriented, and appropriate. Speech normal mitch. No cranial nerve deficits.   Gait:  Antalgic.  No trendelenburg sign bilaterally.   Motor Strength: 5/5 motor strength throughout lower extremities.   Sensory:  No sensory deficit in the lower extremities.   Reflexes:  2 + and symmetric throughout.  Downgoing Babinski's bilaterally.  No clonus or spasticity.  L-Spine:  Full ROM without pain on flexion or extension. Negative facet loading bilaterally.  Negative SLR bilaterally.    SI Joint/Hip:  Positive JERSON left.  Negative FADIR bilaterally.  Pain fully reproduced with internal rotation of left hip.  Normal internal and external " rotation of right hip  No TTP over lumbar paraspinals, bilateral SI joints, hips, piriformis muscles, or GTB.          Imaging:  Narrative     EXAMINATION:  XR HIP 2 VIEW LEFT    CLINICAL HISTORY:  lateral left hip that radiates to groin; Trochanteric bursitis, left hip    TECHNIQUE:  AP view of the pelvis and frog leg lateral view of the left hip were performed.    COMPARISON:  None    FINDINGS:  Skeletal structures show no acute fracture or dislocation. Hip joint spaces are satisfactory without significant cartilage loss. Left femoral head is abnormal with findings of ischemic necrosis including subchondral sclerosis, subchondral infraction line, and slight flattening along the superior contour. Right femoral head has normal contour but probably sclerosis from ischemic necrosis also.  The SI joints are unremarkable.   Impression       AVN in both femoral heads, more severe on the left.    This report was flagged in Epic as abnormal.      Electronically signed by: Sourav Askew MD  Date: 03/11/2019  Time: 15:54         Assessment:       Encounter Diagnoses   Name Primary?    Bilateral hip pain Yes    Avascular necrosis of bone of hip, unspecified laterality     Avascular necrosis of bone of hip, left     Avascular necrosis of bone of right hip          Plan:       Raoul was seen today for hip pain.    Diagnoses and all orders for this visit:    Bilateral hip pain  -     Ambulatory Referral to Orthopedics    Avascular necrosis of bone of hip, unspecified laterality  -     Ambulatory Referral to Orthopedics    Avascular necrosis of bone of hip, left    Avascular necrosis of bone of right hip        Raoul Logan is a 43 y.o. male with chronic left hip pain secondary to avascular necrosis.  Exact etiology of avascular necrosis unclear at this time.    1.  Pertinent imaging studies reviewed by me. Imaging results were discussed with patient.  2.  Refer to Orthopedics () to evaluate for possible  left hip replacement.  3.  Return to clinic as needed.

## 2019-03-28 ENCOUNTER — TELEPHONE (OUTPATIENT)
Dept: ORTHOPEDICS | Facility: CLINIC | Age: 44
End: 2019-03-28

## 2019-04-01 ENCOUNTER — OFFICE VISIT (OUTPATIENT)
Dept: ORTHOPEDICS | Facility: CLINIC | Age: 44
End: 2019-04-01
Payer: COMMERCIAL

## 2019-04-01 VITALS — BODY MASS INDEX: 26.63 KG/M2 | WEIGHT: 175.69 LBS | HEIGHT: 68 IN

## 2019-04-01 DIAGNOSIS — M87.052 AVASCULAR NECROSIS OF BONE OF LEFT HIP: Primary | ICD-10-CM

## 2019-04-01 DIAGNOSIS — K92.2 GASTROINTESTINAL HEMORRHAGE, UNSPECIFIED GASTROINTESTINAL HEMORRHAGE TYPE: ICD-10-CM

## 2019-04-01 PROCEDURE — 3008F BODY MASS INDEX DOCD: CPT | Mod: CPTII,S$GLB,, | Performed by: ORTHOPAEDIC SURGERY

## 2019-04-01 PROCEDURE — 3008F PR BODY MASS INDEX (BMI) DOCUMENTED: ICD-10-PCS | Mod: CPTII,S$GLB,, | Performed by: ORTHOPAEDIC SURGERY

## 2019-04-01 PROCEDURE — 99203 PR OFFICE/OUTPT VISIT, NEW, LEVL III, 30-44 MIN: ICD-10-PCS | Mod: 57,S$GLB,, | Performed by: ORTHOPAEDIC SURGERY

## 2019-04-01 PROCEDURE — 99203 OFFICE O/P NEW LOW 30 MIN: CPT | Mod: 57,S$GLB,, | Performed by: ORTHOPAEDIC SURGERY

## 2019-04-01 PROCEDURE — 99999 PR PBB SHADOW E&M-EST. PATIENT-LVL III: CPT | Mod: PBBFAC,,, | Performed by: ORTHOPAEDIC SURGERY

## 2019-04-01 PROCEDURE — 99999 PR PBB SHADOW E&M-EST. PATIENT-LVL III: ICD-10-PCS | Mod: PBBFAC,,, | Performed by: ORTHOPAEDIC SURGERY

## 2019-04-01 NOTE — LETTER
April 1, 2019      Reji Guerrero, NICOLE  [address]  [facility] [address]           [facility]  [address]  [facility] [address]  Phone: [phone]          Patient: Raoul Logan   MR Number: [id number]   YOB: 1975   Date of Visit: 4/1/2019       Dear Reji Guerrero:    Thank you for referring Raoul Logan to me for evaluation. Attached you will find relevant portions of my assessment and plan of care.    If you have questions, please do not hesitate to call me. I look forward to following Raoul Logan along with you.    Sincerely,    Rakesh Green III, MD    Enclosure  CC:  No Recipients    If you would like to receive this communication electronically, please contact [email or web address] or [phone] to request more information on [facility] Link access.    For providers and/or their staff who would like to refer a patient to [facility], please contact us through our one-stop-shop provider referral line, [facility] , at [phone].    If you feel you have received this communication in error or would no longer like to receive these types of communications, please e-mail [email or web address]

## 2019-04-01 NOTE — PROGRESS NOTES
"Subjective:     HPI:   Raoul Logan is a 43 y.o. male who presents for evaluation of left hip pain    He says symptoms started in October.  He had no specific injuries.  He noted some groin pain which has gotten progressively worse.  Intermittently he has some very sharp shooting severe pain. At baseline it is moderate to severe.  He does have some radiation into the posterior hip some occasional numbness in the lateral thigh no anterior thigh pain no radicular pain or paresthesias.  He says his right hip is asymptomatic.    He went to an urgent care in December of 2018 the note says he had a few weeks of left hip pain he was diagnosed with a groin strain."  Things got worse he she had a shift at work and things got much worse any way to the emergency room on January 12, 2019.  At that time he complained of left hip pain and he also complained of a GI bleeding issue the note said bright lead blood per rectum patient today says he notes blood in his stool intermittently he said he noted at this morning.  The ER note said recommend GI followup he has not done that.     He saw Dr. Manzo from pain management on March 25th who referred him here for left hip osteonecrosis    He is on some Voltaren gel prescribed by the pain clinic he says he can only take it every other day because it is also some GI upset.  We discussed that he needs to stop it especially given the issue of the GI bleeding.  No injections or therapy no assistive devices.  He can walk a mile.  Limitations include walking and progressive pain especially with activities and work.    He is here with his girlfriend.  He is a  at the [employer].     He says it baseline he may have 4-5 drinks a few nights a week.  He has a history of drinking much more than that in the past.  He smokes 1 pack per day of tobacco.  He has not had any significant steroid exposure    He has a history of a left knee arthroscopic surgery many years ago   "     ROS:  The updated medical history is in the chart and has been reviewed. A review of systems is updated and there is no reported vision changes, ear/nose/mouth/throat complaints,  chest pain, shortness of breath, abdominal pain, urological complaints, fevers or chills, psychiatric complaints. Musculoskeletal and neurologcial symptoms are as documented. All other systems are negative.      Objective:   Exam:  There were no vitals filed for this visit.  Body mass index is 26.72 kg/m².    Physical examination included assessment of the patient's general appearance with particular attention to development, nutrition, body habitus, attention to grooming, and any evidence of distress.  Constitutional: The patient is a well-developed, well-nourished patient in no acute distress.   Cardiovascular: Vascular examination included warmth and capillary refill as well inspection for edema and assessment of pedal pulses. Pulses are palpable and regular.  Musculoskeletal: Gait was assessed as to whether it was steady, non-antalgic, and/or required the use of an assist device. The patient was also asked to walk independently and get onto the examination table.  Skin: The skin was examined for any obvious rashes or lesions in the extremity.  Neurologic: Sensation is intact to light touch in the extremity. The patient has good coordination without hyperreflexia and is alert and oriented to person, place and time and has normal mood and affect.     All of the above were examined and found to be within normal limits except for the following pertinent clinical findings:    No limp nonantalgic gait negative Trendelenburg sign.  He can do a single leg stance without discomfort.  Left hip groin pain with active straight leg raise.  Hip range of motion 0-120 flexion 40 abduction 30 adduction 50 external 20 internal rotation which does recreate his symptoms. No leg length discrepancy supine and patient does not notice a difference.  His  skin is intact to the anterior hip.  He has no pain with right hip passive range of motion or active straight leg raise      Imaging:  Indication:  Left hip pain  Exam Ordered: Radiographs include an anteroposterior pelvis, an anteroposterior and lateral view of the proximal femur including the hip joint.  Details of Examination: Today's exam shows evidence of joint space narrowing, femoral head flattening, crescent sign, and subchondral sclerosis, all consistent with osteonecrosis of the hip.  No other significant findings are noted.  Impression:  Osteonecrosis, Left Hip        Assessment:     Avascular necrosis of bone of left hip [M87.052]    Concerning history of GI bleeding, blood in stool today  History of heavy drinking in the past   Smoking    Plan:       We discussed natural history pathophysiology and treatment options for osteonecrosis.    We discussed non operative options including bisphosphonates, cord compression, stem cells.  At this point in terms of his left hip he has collapse so these with typically not be indicated.  His definitive solution is going to be a total hip replacement. I think he would be a good candidate for an anterior approach.    However we discussed that his GI bleeding issue needs to be evaluated and treated prior to surgery especially since he will require blood thinners postoperatively.    We discussed stopping the Voltaren is it is causing GI upset and potentially worsening bleeding.  He can try Tylenol instead in a cane in the right hand to try to offload the hip.    We have referred him to Gastroenterology and have sent a note to José Miguel courtney his new primary care doctor regarding our concerns and plan.    We can see him back once he has a disposition from gastroenterology discuss a left anterior approach total hip replacement    No orders of the defined types were placed in this encounter.      History reviewed. No pertinent past medical history.    History reviewed. No  pertinent surgical history.    Family History   Problem Relation Age of Onset    Cancer Mother     No Known Problems Father        Social History     Socioeconomic History    Marital status: Single     Spouse name: None    Number of children: None    Years of education: None    Highest education level: None   Occupational History    None   Social Needs    Financial resource strain: None    Food insecurity:     Worry: None     Inability: None    Transportation needs:     Medical: None     Non-medical: None   Tobacco Use    Smoking status: Current Every Day Smoker    Smokeless tobacco: Never Used   Substance and Sexual Activity    Alcohol use: No     Frequency: Never    Drug use: No    Sexual activity: Yes     Partners: Female   Lifestyle    Physical activity:     Days per week: None     Minutes per session: None    Stress: None   Relationships    Social connections:     Talks on phone: None     Gets together: None     Attends Temple service: None     Active member of club or organization: None     Attends meetings of clubs or organizations: None     Relationship status: None    Intimate partner violence:     Fear of current or ex partner: None     Emotionally abused: None     Physically abused: None     Forced sexual activity: None   Other Topics Concern    None   Social History Narrative    None

## 2019-04-04 ENCOUNTER — OFFICE VISIT (OUTPATIENT)
Dept: GASTROENTEROLOGY | Facility: CLINIC | Age: 44
End: 2019-04-04
Payer: COMMERCIAL

## 2019-04-04 ENCOUNTER — LAB VISIT (OUTPATIENT)
Dept: LAB | Facility: HOSPITAL | Age: 44
End: 2019-04-04
Payer: COMMERCIAL

## 2019-04-04 VITALS
BODY MASS INDEX: 25.53 KG/M2 | SYSTOLIC BLOOD PRESSURE: 142 MMHG | DIASTOLIC BLOOD PRESSURE: 95 MMHG | WEIGHT: 168.44 LBS | HEIGHT: 68 IN | HEART RATE: 68 BPM

## 2019-04-04 DIAGNOSIS — K92.2 GASTROINTESTINAL HEMORRHAGE, UNSPECIFIED GASTROINTESTINAL HEMORRHAGE TYPE: ICD-10-CM

## 2019-04-04 DIAGNOSIS — F19.90 EXCESSIVE USE OF NONSTEROIDAL ANTI-INFLAMMATORY DRUGS (NSAIDS): ICD-10-CM

## 2019-04-04 DIAGNOSIS — K92.2 GASTROINTESTINAL HEMORRHAGE, UNSPECIFIED GASTROINTESTINAL HEMORRHAGE TYPE: Primary | ICD-10-CM

## 2019-04-04 LAB
BASOPHILS # BLD AUTO: 0.05 K/UL (ref 0–0.2)
BASOPHILS NFR BLD: 0.7 % (ref 0–1.9)
DIFFERENTIAL METHOD: NORMAL
EOSINOPHIL # BLD AUTO: 0.2 K/UL (ref 0–0.5)
EOSINOPHIL NFR BLD: 3.3 % (ref 0–8)
ERYTHROCYTE [DISTWIDTH] IN BLOOD BY AUTOMATED COUNT: 13.8 % (ref 11.5–14.5)
HCT VFR BLD AUTO: 44.6 % (ref 40–54)
HGB BLD-MCNC: 14.5 G/DL (ref 14–18)
IMM GRANULOCYTES # BLD AUTO: 0.03 K/UL (ref 0–0.04)
IMM GRANULOCYTES NFR BLD AUTO: 0.4 % (ref 0–0.5)
LYMPHOCYTES # BLD AUTO: 2.1 K/UL (ref 1–4.8)
LYMPHOCYTES NFR BLD: 29.9 % (ref 18–48)
MCH RBC QN AUTO: 27.4 PG (ref 27–31)
MCHC RBC AUTO-ENTMCNC: 32.5 G/DL (ref 32–36)
MCV RBC AUTO: 84 FL (ref 82–98)
MONOCYTES # BLD AUTO: 0.6 K/UL (ref 0.3–1)
MONOCYTES NFR BLD: 7.8 % (ref 4–15)
NEUTROPHILS # BLD AUTO: 4.1 K/UL (ref 1.8–7.7)
NEUTROPHILS NFR BLD: 57.9 % (ref 38–73)
NRBC BLD-RTO: 0 /100 WBC
PLATELET # BLD AUTO: 248 K/UL (ref 150–350)
PMV BLD AUTO: 10.7 FL (ref 9.2–12.9)
RBC # BLD AUTO: 5.3 M/UL (ref 4.6–6.2)
WBC # BLD AUTO: 7.02 K/UL (ref 3.9–12.7)

## 2019-04-04 PROCEDURE — 99999 PR PBB SHADOW E&M-EST. PATIENT-LVL III: ICD-10-PCS | Mod: PBBFAC,,, | Performed by: NURSE PRACTITIONER

## 2019-04-04 PROCEDURE — 3008F PR BODY MASS INDEX (BMI) DOCUMENTED: ICD-10-PCS | Mod: CPTII,S$GLB,, | Performed by: NURSE PRACTITIONER

## 2019-04-04 PROCEDURE — 99999 PR PBB SHADOW E&M-EST. PATIENT-LVL III: CPT | Mod: PBBFAC,,, | Performed by: NURSE PRACTITIONER

## 2019-04-04 PROCEDURE — 3008F BODY MASS INDEX DOCD: CPT | Mod: CPTII,S$GLB,, | Performed by: NURSE PRACTITIONER

## 2019-04-04 PROCEDURE — 99204 PR OFFICE/OUTPT VISIT, NEW, LEVL IV, 45-59 MIN: ICD-10-PCS | Mod: S$GLB,,, | Performed by: NURSE PRACTITIONER

## 2019-04-04 PROCEDURE — 36415 COLL VENOUS BLD VENIPUNCTURE: CPT

## 2019-04-04 PROCEDURE — 85025 COMPLETE CBC W/AUTO DIFF WBC: CPT

## 2019-04-04 PROCEDURE — 99204 OFFICE O/P NEW MOD 45 MIN: CPT | Mod: S$GLB,,, | Performed by: NURSE PRACTITIONER

## 2019-04-04 NOTE — PATIENT INSTRUCTIONS
Stay away from NSAID medications. Tylenol is the only safe medication for the GI tract and you may take it for joint pain.

## 2019-04-04 NOTE — LETTER
April 4, 2019      Rakesh Green III, MD  [address] [facility] [address]           [facility] - Gastroenterology  [address]  [place] [address]  Phone: [phone]  Fax: [phone]          Patient: Raoul Logan   MR Number: [id number]   YOB: 1975   Date of Visit: 4/4/2019       Dear Dr. Rakesh Green III:    Thank you for referring Raoul Logan to me for evaluation. Attached you will find relevant portions of my assessment and plan of care.    If you have questions, please do not hesitate to call me. I look forward to following Raoul Logan along with you.    Sincerely,    Dione Solorio, NICOLE    Enclosure  CC:  No Recipients    If you would like to receive this communication electronically, please contact [email or web address] or [phone] to request more information on [employer] access.    For providers and/or their staff who would like to refer a patient to [facility], please contact us through our one-stop-shop provider referral line, [facility], at [phone].    If you feel you have received this communication in error or would no longer like to receive these types of communications, please e-mail [email or web address]

## 2019-04-04 NOTE — PROGRESS NOTES
[facility] Consultation Note    Reason for Consult:  The encounter diagnosis was Gastrointestinal hemorrhage, unspecified gastrointestinal hemorrhage type.    PCP:   Primary Doctor No   [id number] / [place] [address]    Referring MD:  Rakesh Green Iii, Md  [address] [address]    HPI:  This is a 44 y.o. male here for evaluation blood in stool. He was sent by orthopedic surgeon for concern the bleeding may get worse after his potential upcoming hip replacement as he will need to be on blood thinners post op.    Hematochezia  started 4 years ago. Comes and goes. When he was on the Voltaren it made it worse.  Last noticed late last night or this am.  Over the last three days it restarted and every time he has a BM there is moderate BRB in the stool.   Denies diarrhea, constipation, or change in bowel habits.  Had some nausea and vomiting with the Voltaren. No hematochezia. Stopped voltaren on the first of April.  No fever. Reports chills. Has had weight loss.  Has maybe had a change in appetite. He has been worried about the hip replacemet. Weight loss since Jan.  Reports fatigue  Denies abdominal pain  Has very occasional reflux.    ROS:  Constitutional: No fevers, chills, No weight loss  ENT: No allergies  CV: No chest pain  Pulm: No cough, No shortness of breath  Ophtho: No vision changes  GI: see HPI  Derm: No rash  Heme:  No bruising  MSK: + arthritis  : No dysuria, No hematuria  Endo: No heat intolerance. +cold intolerance  Neuro: No syncope  Psych: No anxiety, No depression    Medical History:  has no past medical history on file.    Surgical History:  has a past surgical history that includes Knee surgery (Left, 1991).    Family History: family history includes Cancer in his mother; No Known Problems in his father..     Social History:  reports that he has been smoking.  He has never used smokeless tobacco. He reports that he does not drink alcohol  "or use drugs.    Review of patient's allergies indicates:   Allergen Reactions    Pcn [penicillins] Anaphylaxis       Current Outpatient Medications on File Prior to Visit   Medication Sig Dispense Refill    acetaminophen (TYLENOL ORAL) Take by mouth.      loratadine (CLARITIN) 10 mg tablet Take 1 tablet (10 mg total) by mouth once daily. 30 tablet 0    [DISCONTINUED] diclofenac (VOLTAREN) 75 MG EC tablet Take 1 tablet (75 mg total) by mouth 2 (two) times daily. 60 tablet 1     No current facility-administered medications on file prior to visit.          Objective Findings:    Vital Signs:  BP (!) 142/95   Pulse 68   Ht 5' 8" (1.727 m)   Wt 76.4 kg (168 lb 6.9 oz)   BMI 25.61 kg/m²   Body mass index is 25.61 kg/m².    Physical Exam:  General Appearance: Well appearing in no acute distress  Head:   Normocephalic, without obvious abnormality  Eyes:    No scleral icterus  ENT: Neck supple  Lungs: CTA bilaterally in anterior and posterior fields, no wheezes, no crackles.  Heart:  Regular rate and rhythm, S1, S2 normal, no murmurs heard  Abdomen: Soft, non tender, non distended with positive bowel sounds in all four quadrants.   Extremities: No edema  Skin: No rash  Neurologic: AAO x 3      Labs:  Lab Results   Component Value Date    WBC 7.02 04/04/2019    HGB 14.5 04/04/2019    HCT 44.6 04/04/2019     04/04/2019    ALT 38 01/11/2019    AST 28 01/11/2019     01/11/2019    K 3.9 01/11/2019     01/11/2019    CREATININE 0.9 01/11/2019    BUN 20 01/11/2019    CO2 21 (L) 01/11/2019       Imaging:  None  Endoscopy:    None    Assessment:  44 y.o. WM referred for hematochezia by surgeon. Surgeon concerned for post op bleeding risk as he will need to be placed on blood thinners. He has had hematochezia in the past but it has been more consistent recently when he was on Voltaren for joint pain. While on the medication he developed N/V without hematemesis or melena. Since stopping the medication on 4/1 " that has resolved.   He has had unintentional weight loss and has had a decrease in appetite. He related this to nerves. He has lost 11 lbs since Jan 2019.  I will get a cbc today to check for anemia.     After discussing with Dr. Ware, we will perform EGD and Colonoscopy due to the upper GI symptoms he has experienced with the Voltaren use.       Recommendations:  1. CBC today  2. Egd/colonoscopy    F/u pending endoscopies    Order summary:  Orders Placed This Encounter    CBC auto differential         Thank you so much for allowing me to participate in the care of Raoul Solorio, HORTENSIAP-C

## 2019-04-21 NOTE — PROGRESS NOTES
Routine Office Visit    Patient Name: Raoul Logan    : 1975  MRN: [id number]    Subjective:  Raoul is a 44 y.o. male who presents today for:    1. Left hip pain  Patient presenting today for chronic left hip pain that has begun to interfere with his work.  He states that this pain has been ongoing for a long time.  His fiance is with him and states that he can never get comfortable.  There is never any swelling or redness in the hips.  He states that it keeps him from squatting, which he needs to do frequently for his job.  The pain is worsened when he lays on the left side as well.  He denies any history of OA or RA. No trauma to the hip.      Past Medical History  History reviewed. No pertinent past medical history.    Past Surgical History  Past Surgical History:   Procedure Laterality Date    KNEE SURGERY Left        Family History  Family History   Problem Relation Age of Onset    Cancer Mother     No Known Problems Father     Colon cancer Neg Hx     Esophageal cancer Neg Hx     Stomach cancer Neg Hx     Rectal cancer Neg Hx     Crohn's disease Neg Hx     Irritable bowel syndrome Neg Hx        Social History  Social History     Socioeconomic History    Marital status: Single     Spouse name: Not on file    Number of children: Not on file    Years of education: Not on file    Highest education level: Not on file   Occupational History    Not on file   Social Needs    Financial resource strain: Not on file    Food insecurity:     Worry: Not on file     Inability: Not on file    Transportation needs:     Medical: Not on file     Non-medical: Not on file   Tobacco Use    Smoking status: Current Every Day Smoker    Smokeless tobacco: Never Used   Substance and Sexual Activity    Alcohol use: No     Frequency: Never    Drug use: No    Sexual activity: Yes     Partners: Female   Lifestyle    Physical activity:     Days per week: Not on file     Minutes per session: Not on file     Stress: Not on file   Relationships    Social connections:     Talks on phone: Not on file     Gets together: Not on file     Attends Alevism service: Not on file     Active member of club or organization: Not on file     Attends meetings of clubs or organizations: Not on file     Relationship status: Not on file   Other Topics Concern    Not on file   Social History Narrative    Not on file       Current Medications  Current Outpatient Medications on File Prior to Visit   Medication Sig Dispense Refill    loratadine (CLARITIN) 10 mg tablet Take 1 tablet (10 mg total) by mouth once daily. 30 tablet 0     No current facility-administered medications on file prior to visit.        Allergies   Review of patient's allergies indicates:   Allergen Reactions    Pcn [penicillins] Anaphylaxis       Review of Systems (Pertinent positives)  Review of Systems   Constitutional: Negative.    HENT: Negative.    Eyes: Negative.    Respiratory: Negative.    Cardiovascular: Negative.    Gastrointestinal: Negative.    Musculoskeletal: Positive for joint pain.   Skin: Negative.          /77 (BP Location: Left arm, Patient Position: Sitting)   Pulse 75   Temp 97.7 °F (36.5 °C) (Oral)   Wt 84 kg (185 lb 3 oz)   SpO2 97%   BMI 27.35 kg/m²     GENERAL APPEARANCE: in no apparent distress and well developed and well nourished  HEENT: PERRL, EOMI, Sclera clear, anicteric, Oropharynx clear, no lesions, Neck supple with midline trachea  NECK: normal, supple, no adenopathy, thyroid normal in size  RESPIRATORY: appears well, vitals normal, no respiratory distress, acyanotic, normal RR, chest clear, no wheezing, crepitations, rhonchi, normal symmetric air entry  HEART: regular rate and rhythm, S1, S2 normal, no murmur, click, rub or gallop.    ABDOMEN: abdomen is soft without tenderness, no masses, no hernias, no organomegaly, no rebound, no guarding. Suprapubic tenderness absent. No CVA tenderness.  NEUROLOGIC: normal  without focal findings, CN II-XII are intact.    Extremities: warm/well perfused.  No abnormal hair patterns.  No clubbing, cyanosis or edema.  Decreased ROM in left hip due to pain and pain on palpation of lateral left hip  SKIN: no rashes, no wounds, no other lesions  PSYCH: Alert, oriented x 3, thought content appropriate, speech normal, pleasant and cooperative, good eye contact, well groomed    Assessment/Plan:  Raoul Logan is a 44 y.o. male who presents today for :    Raoul was seen today for pain in hip [id number].    Diagnoses and all orders for this visit:    Greater trochanteric bursitis of left hip  -     X-Ray Hip 2 or 3 views Left; Future  -     Discontinue: diclofenac (VOLTAREN) 75 MG EC tablet; Take 1 tablet (75 mg total) by mouth 2 (two) times daily.  -     Ambulatory Referral to Pain Clinic      1.  xrays to be done today  2.  voltaren for pain  3.  Refer to pain clinic for possible joint injections  4.  Follow up as needed    José Miguel Carroll MD

## 2019-05-16 DIAGNOSIS — Z12.11 SPECIAL SCREENING FOR MALIGNANT NEOPLASMS, COLON: Primary | ICD-10-CM

## 2019-05-16 RX ORDER — POLYETHYLENE GLYCOL 3350, SODIUM SULFATE ANHYDROUS, SODIUM BICARBONATE, SODIUM CHLORIDE, POTASSIUM CHLORIDE 236; 22.74; 6.74; 5.86; 2.97 G/4L; G/4L; G/4L; G/4L; G/4L
4 POWDER, FOR SOLUTION ORAL ONCE
Qty: 4000 ML | Refills: 0 | Status: SHIPPED | OUTPATIENT
Start: 2019-05-16 | End: 2019-05-16

## 2019-06-12 ENCOUNTER — TELEPHONE (OUTPATIENT)
Dept: ORTHOPEDICS | Facility: CLINIC | Age: 44
End: 2019-06-12

## 2019-06-12 NOTE — TELEPHONE ENCOUNTER
----- Message from Tutu Schrader sent at 6/12/2019 12:41 PM CDT -----  Contact: self  Needs Advice    Reason for call: Pt states that the pain is getting worse, cannot wait until his scope or colonoscopy want to schedule surgery as soon as possible        Communication Preference: Phone      Additional Information: n/a

## 2019-06-12 NOTE — PROGRESS NOTES
Spoke with pt. Informed pt I forwarded Dr. Green a message and he will be getting back with him soon he is in Sx today. Pt gave verbal understanding.

## 2019-06-13 ENCOUNTER — TELEPHONE (OUTPATIENT)
Dept: ORTHOPEDICS | Facility: CLINIC | Age: 44
End: 2019-06-13

## 2019-06-13 NOTE — PROGRESS NOTES
Noam for pt. explained to pt in message that Dr. Green cannot safely do his surgery until his GI bleeding issue is evaluated and treated. If pain is severe he can go to the emergency room.

## 2019-06-13 NOTE — TELEPHONE ENCOUNTER
----- Message from Rakesh Green III, MD sent at 6/12/2019  4:49 PM CDT -----  Contact: self  Please let him know that we cannot safely do his surgery until his GI bleeding issue is evaluated and treated. If pain is severe he can go to the emergency room.     ----- Message -----  From: Brii Crawford MA  Sent: 6/12/2019   1:07 PM  To: Rakesh Green III, MD        ----- Message -----  From: Tutu Schrader  Sent: 6/12/2019  12:41 PM  To: Peter RUVALC[facility] Staff    Needs Advice    Reason for call: Pt states that the pain is getting worse, cannot wait until his scope or colonoscopy want to schedule surgery as soon as possible        Communication Preference: Phone      Additional Information: n/a

## 2019-07-01 ENCOUNTER — HOSPITAL ENCOUNTER (OUTPATIENT)
Facility: HOSPITAL | Age: 44
Discharge: HOME | End: 2019-07-01
Attending: INTERNAL MEDICINE | Admitting: INTERNAL MEDICINE
Payer: COMMERCIAL

## 2019-07-01 ENCOUNTER — ANESTHESIA (OUTPATIENT)
Dept: ENDOSCOPY | Facility: HOSPITAL | Age: 44
End: 2019-07-01
Payer: COMMERCIAL

## 2019-07-01 ENCOUNTER — ANESTHESIA EVENT (OUTPATIENT)
Dept: ENDOSCOPY | Facility: HOSPITAL | Age: 44
End: 2019-07-01
Payer: COMMERCIAL

## 2019-07-01 VITALS
SYSTOLIC BLOOD PRESSURE: 113 MMHG | OXYGEN SATURATION: 98 % | HEART RATE: 66 BPM | HEIGHT: 69 IN | BODY MASS INDEX: 26.36 KG/M2 | WEIGHT: 178 LBS | TEMPERATURE: 98 F | RESPIRATION RATE: 16 BRPM | DIASTOLIC BLOOD PRESSURE: 75 MMHG

## 2019-07-01 DIAGNOSIS — K92.1 HEMATOCHEZIA: Primary | ICD-10-CM

## 2019-07-01 PROCEDURE — E9220 PRA ENDO ANESTHESIA: HCPCS | Mod: ,,, | Performed by: NURSE ANESTHETIST, CERTIFIED REGISTERED

## 2019-07-01 PROCEDURE — 63600175 PHARM REV CODE 636 W HCPCS: Performed by: NURSE ANESTHETIST, CERTIFIED REGISTERED

## 2019-07-01 PROCEDURE — 27201089 HC SNARE, DISP (ANY): Performed by: INTERNAL MEDICINE

## 2019-07-01 PROCEDURE — 37000008 HC ANESTHESIA 1ST 15 MINUTES: Performed by: INTERNAL MEDICINE

## 2019-07-01 PROCEDURE — 88305 TISSUE SPECIMEN TO PATHOLOGY - SURGERY: ICD-10-PCS | Mod: 26,,, | Performed by: PATHOLOGY

## 2019-07-01 PROCEDURE — 88305 TISSUE EXAM BY PATHOLOGIST: CPT | Mod: 26,,, | Performed by: PATHOLOGY

## 2019-07-01 PROCEDURE — 43235 EGD DIAGNOSTIC BRUSH WASH: CPT | Mod: 51,,, | Performed by: INTERNAL MEDICINE

## 2019-07-01 PROCEDURE — 43235 PR EGD, FLEX, DIAGNOSTIC: ICD-10-PCS | Mod: 51,,, | Performed by: INTERNAL MEDICINE

## 2019-07-01 PROCEDURE — 25000003 PHARM REV CODE 250: Performed by: INTERNAL MEDICINE

## 2019-07-01 PROCEDURE — 45385 COLONOSCOPY W/LESION REMOVAL: CPT | Mod: ,,, | Performed by: INTERNAL MEDICINE

## 2019-07-01 PROCEDURE — 25000003 PHARM REV CODE 250: Performed by: NURSE ANESTHETIST, CERTIFIED REGISTERED

## 2019-07-01 PROCEDURE — 88305 TISSUE EXAM BY PATHOLOGIST: CPT | Performed by: PATHOLOGY

## 2019-07-01 PROCEDURE — 37000009 HC ANESTHESIA EA ADD 15 MINS: Performed by: INTERNAL MEDICINE

## 2019-07-01 PROCEDURE — 45385 COLONOSCOPY W/LESION REMOVAL: CPT | Performed by: INTERNAL MEDICINE

## 2019-07-01 PROCEDURE — E9220 PRA ENDO ANESTHESIA: ICD-10-PCS | Mod: ,,, | Performed by: NURSE ANESTHETIST, CERTIFIED REGISTERED

## 2019-07-01 PROCEDURE — 45385 PR COLONOSCOPY,REMV LESN,SNARE: ICD-10-PCS | Mod: ,,, | Performed by: INTERNAL MEDICINE

## 2019-07-01 PROCEDURE — 43235 EGD DIAGNOSTIC BRUSH WASH: CPT | Performed by: INTERNAL MEDICINE

## 2019-07-01 RX ORDER — PROPOFOL 10 MG/ML
VIAL (ML) INTRAVENOUS CONTINUOUS PRN
Status: DISCONTINUED | OUTPATIENT
Start: 2019-07-01 | End: 2019-07-01

## 2019-07-01 RX ORDER — SODIUM CHLORIDE 9 MG/ML
INJECTION, SOLUTION INTRAVENOUS CONTINUOUS
Status: DISCONTINUED | OUTPATIENT
Start: 2019-07-01 | End: 2019-07-01 | Stop reason: HOSPADM

## 2019-07-01 RX ORDER — LIDOCAINE HCL/PF 100 MG/5ML
SYRINGE (ML) INTRAVENOUS
Status: DISCONTINUED | OUTPATIENT
Start: 2019-07-01 | End: 2019-07-01

## 2019-07-01 RX ORDER — SODIUM CHLORIDE 0.9 % (FLUSH) 0.9 %
10 SYRINGE (ML) INJECTION
Status: DISCONTINUED | OUTPATIENT
Start: 2019-07-01 | End: 2019-07-01 | Stop reason: HOSPADM

## 2019-07-01 RX ORDER — GLYCOPYRROLATE 0.2 MG/ML
INJECTION INTRAMUSCULAR; INTRAVENOUS
Status: DISCONTINUED | OUTPATIENT
Start: 2019-07-01 | End: 2019-07-01

## 2019-07-01 RX ORDER — PROPOFOL 10 MG/ML
VIAL (ML) INTRAVENOUS
Status: DISCONTINUED | OUTPATIENT
Start: 2019-07-01 | End: 2019-07-01

## 2019-07-01 RX ADMIN — PROPOFOL 175 MCG/KG/MIN: 10 INJECTION, EMULSION INTRAVENOUS at 02:07

## 2019-07-01 RX ADMIN — SODIUM CHLORIDE: 0.9 INJECTION, SOLUTION INTRAVENOUS at 02:07

## 2019-07-01 RX ADMIN — LIDOCAINE HYDROCHLORIDE 60 MG: 20 INJECTION, SOLUTION INTRAVENOUS at 02:07

## 2019-07-01 RX ADMIN — GLYCOPYRROLATE 0.2 MG: 0.2 INJECTION, SOLUTION INTRAMUSCULAR; INTRAVENOUS at 02:07

## 2019-07-01 RX ADMIN — PROPOFOL 10 MG: 10 INJECTION, EMULSION INTRAVENOUS at 02:07

## 2019-07-01 RX ADMIN — TOPICAL ANESTHETIC 1 EACH: 200 SPRAY DENTAL; PERIODONTAL at 02:07

## 2019-07-01 RX ADMIN — PROPOFOL 50 MG: 10 INJECTION, EMULSION INTRAVENOUS at 02:07

## 2019-07-01 NOTE — ANESTHESIA PREPROCEDURE EVALUATION
07/01/2019  Raoul Logan is a 44 y.o., male.  Patient Active Problem List   Diagnosis    Avascular necrosis of bone of hip, left    Avascular necrosis of bone of right hip    Bilateral hip pain     History reviewed. No pertinent past medical history.  Past Surgical History:   Procedure Laterality Date    KNEE SURGERY Left 1991         Anesthesia Evaluation    I have reviewed the Patient Summary Reports.     I have reviewed the Medications.     Review of Systems  Anesthesia Hx:   Denies Personal Hx of Anesthesia complications.       Physical Exam  General:  Well nourished    Airway/Jaw/Neck:  Airway Findings: Mouth Opening: Normal Tongue: Normal  General Airway Assessment: Adult  Mallampati: II  TM Distance: Normal, at least 6 cm      Dental:  Dental Findings: In tact   Chest/Lungs:  Chest/Lungs Findings: Clear to auscultation, Normal Respiratory Rate     Heart/Vascular:  Heart Findings: Rate: Normal  Rhythm: Regular Rhythm  Sounds: Normal        Mental Status:  Mental Status Findings:  Cooperative, Alert and Oriented         Anesthesia Plan  Type of Anesthesia, risks & benefits discussed:  Anesthesia Type:  general  Patient's Preference:   Intra-op Monitoring Plan: standard ASA monitors  Intra-op Monitoring Plan Comments:   Post Op Pain Control Plan: per primary service following discharge from PACU  Post Op Pain Control Plan Comments:   Induction:   IV  Beta Blocker:  Patient is not currently on a Beta-Blocker (No further documentation required).       Informed Consent: Patient understands risks and agrees with Anesthesia plan.  Questions answered. Anesthesia consent signed with patient.  ASA Score: 2     Day of Surgery Review of History & Physical: I have interviewed and examined the patient. I have reviewed the patient's H&P dated:  There are no significant changes.  H&P update referred to the  provider.         Ready For Surgery From Anesthesia Perspective.

## 2019-07-01 NOTE — TRANSFER OF CARE
"Anesthesia Transfer of Care Note    Patient: Raoul Logan    Procedure(s) Performed: Procedure(s) (LRB):  EGD (ESOPHAGOGASTRODUODENOSCOPY) (N/A)  COLONOSCOPY (N/A)    Patient location: GI    Anesthesia Type: general    Transport from OR: Transported from OR on room air with adequate spontaneous ventilation    Post pain: adequate analgesia    Post assessment: no apparent anesthetic complications and tolerated procedure well    Post vital signs: stable    Level of consciousness: sedated and responds to stimulation    Nausea/Vomiting: no nausea/vomiting    Complications: none    Transfer of care protocol was followed      Last vitals:   Visit Vitals  /77 (BP Location: Left arm, Patient Position: Lying)   Pulse 73   Temp 36.7 °C (98 °F) (Temporal)   Resp 17   Ht 5' 9" (1.753 m)   Wt 80.7 kg (178 lb)   SpO2 96%   BMI 26.29 kg/m²     "

## 2019-07-01 NOTE — PROVATION PATIENT INSTRUCTIONS
Discharge Summary/Instructions after an Endoscopic Procedure  Patient Name: Raoul Logan  Patient MRN: [id number]  Patient YOB: 1975 Monday, July 01, 2019  Petros Ware MD  RESTRICTIONS:  During your procedure today, you received medications for sedation.  These   medications may affect your judgment, balance and coordination.  Therefore,   for 24 hours, you have the following restrictions:   - DO NOT drive a car, operate machinery, make legal/financial decisions,   sign important papers or drink alcohol.    ACTIVITY:  Today: no heavy lifting, straining or running due to procedural   sedation/anesthesia.  The following day: return to full activity including work.  DIET:  Eat and drink normally unless instructed otherwise.     TREATMENT FOR COMMON SIDE EFFECTS:  - Mild abdominal pain, nausea, belching, bloating or excessive gas:  rest,   eat lightly and use a heating pad.  - Sore Throat: treat with throat lozenges and/or gargle with warm salt   water.  - Because air was used during the procedure, expelling large amounts of air   from your rectum or belching is normal.  - If a bowel prep was taken, you may not have a bowel movement for 1-3 days.    This is normal.  SYMPTOMS TO WATCH FOR AND REPORT TO YOUR PHYSICIAN:  1. Abdominal pain or bloating, other than gas cramps.  2. Chest pain.  3. Back pain.  4. Signs of infection such as: chills or fever occurring within 24 hours   after the procedure.  5. Rectal bleeding, which would show as bright red, maroon, or black stools.   (A tablespoon of blood from the rectum is not serious, especially if   hemorrhoids are present.)  6. Vomiting.  7. Weakness or dizziness.  GO DIRECTLY TO THE NEAREST EMERGENCY ROOM IF YOU HAVE ANY OF THE FOLLOWING:      Difficulty breathing              Chills and/or fever over 101 F   Persistent vomiting and/or vomiting blood   Severe abdominal pain   Severe chest pain   Black, tarry stools   Bleeding- more than one tablespoon   Any  other symptom or condition that you feel may need urgent attention  Your doctor recommends these additional instructions:  If any biopsies were taken, your doctors clinic will contact you in 1 to 2   weeks with any results.  - Perform a colonoscopy now.   - See colonoscopy report for further details.  For questions, problems or results please call your physician - Petros Ware MD at Work:  [phone].  [facility], EMERGENCY ROOM PHONE NUMBER: [phone]  IF A COMPLICATION OR EMERGENCY SITUATION ARISES AND YOU ARE UNABLE TO REACH   YOUR PHYSICIAN - GO DIRECTLY TO THE EMERGENCY ROOM.  Petros Ware MD  7/1/2019 2:59:35 PM  This report has been verified and signed electronically.  [employer]ATION

## 2019-07-01 NOTE — DISCHARGE INSTRUCTIONS

## 2019-07-01 NOTE — PROVATION PATIENT INSTRUCTIONS
Discharge Summary/Instructions after an Endoscopic Procedure  Patient Name: Raoul Logan  Patient MRN: [id number]  Patient YOB: 1975 Monday, July 01, 2019  Petros Ware MD  RESTRICTIONS:  During your procedure today, you received medications for sedation.  These   medications may affect your judgment, balance and coordination.  Therefore,   for 24 hours, you have the following restrictions:   - DO NOT drive a car, operate machinery, make legal/financial decisions,   sign important papers or drink alcohol.    ACTIVITY:  Today: no heavy lifting, straining or running due to procedural   sedation/anesthesia.  The following day: return to full activity including work.  DIET:  Eat and drink normally unless instructed otherwise.     TREATMENT FOR COMMON SIDE EFFECTS:  - Mild abdominal pain, nausea, belching, bloating or excessive gas:  rest,   eat lightly and use a heating pad.  - Sore Throat: treat with throat lozenges and/or gargle with warm salt   water.  - Because air was used during the procedure, expelling large amounts of air   from your rectum or belching is normal.  - If a bowel prep was taken, you may not have a bowel movement for 1-3 days.    This is normal.  SYMPTOMS TO WATCH FOR AND REPORT TO YOUR PHYSICIAN:  1. Abdominal pain or bloating, other than gas cramps.  2. Chest pain.  3. Back pain.  4. Signs of infection such as: chills or fever occurring within 24 hours   after the procedure.  5. Rectal bleeding, which would show as bright red, maroon, or black stools.   (A tablespoon of blood from the rectum is not serious, especially if   hemorrhoids are present.)  6. Vomiting.  7. Weakness or dizziness.  GO DIRECTLY TO THE NEAREST EMERGENCY ROOM IF YOU HAVE ANY OF THE FOLLOWING:      Difficulty breathing              Chills and/or fever over 101 F   Persistent vomiting and/or vomiting blood   Severe abdominal pain   Severe chest pain   Black, tarry stools   Bleeding- more than one tablespoon   Any  other symptom or condition that you feel may need urgent attention  Your doctor recommends these additional instructions:  If any biopsies were taken, your doctors clinic will contact you in 1 to 2   weeks with any results.  - Discharge patient to home.   - Patient has a contact number available for emergencies.  The signs and   symptoms of potential delayed complications were discussed with the   patient.  Return to normal activities tomorrow.  Written discharge   instructions were provided to the patient.   - Resume previous diet.   - Continue present medications.   - Await pathology results.   - Telephone GI clinic for pathology results in 1 week.   - Repeat colonoscopy in 5 years for surveillance.  For questions, problems or results please call your physician - Petros Ware MD at Work:  [phone].  [facility], EMERGENCY ROOM PHONE NUMBER: [phone]  IF A COMPLICATION OR EMERGENCY SITUATION ARISES AND YOU ARE UNABLE TO REACH   YOUR PHYSICIAN - GO DIRECTLY TO THE EMERGENCY ROOM.  Petros Ware MD  7/1/2019 3:23:11 PM  This report has been verified and signed electronically.  PROVATION

## 2019-07-01 NOTE — H&P
Short Stay Endoscopy History and Physical    PCP - José Miguel Carroll MD    Procedure - EGD/Colonoscopy  ASA - per anesthesia  Mallampati - per anesthesia  History of Anesthesia problems - no  Family history Anesthesia problems -  no   Plan of anesthesia - MAC    HPI:  This is a 44 y.o. male here for evaluation of dyspepsia and hematochezia. Last bleeding was seen 2 weeks ago.        ROS:  Constitutional: No fevers, chills, No weight loss  CV: No chest pain  Pulm: No cough, No shortness of breath  Ophtho: No vision changes  GI: see HPI    Medical History:  has no past medical history on file.    Surgical History:  has a past surgical history that includes Knee surgery (Left, 1991).    Family History: family history includes Cancer in his mother; No Known Problems in his father.. Otherwise no colon cancer, inflammatory bowel disease, or GI malignancies.    Social History:  reports that he has been smoking.  He has been smoking about 0.50 packs per day. He has never used smokeless tobacco. He reports that he drinks alcohol. He reports that he does not use drugs.    Review of patient's allergies indicates:   Allergen Reactions    Pcn [penicillins] Anaphylaxis       Medications:   Medications Prior to Admission   Medication Sig Dispense Refill Last Dose    acetaminophen (TYLENOL ORAL) Take by mouth.   Past Week at Unknown time    loratadine (CLARITIN) 10 mg tablet Take 1 tablet (10 mg total) by mouth once daily. 30 tablet 0        Physical Exam:    Vital Signs:   Vitals:    07/01/19 1344   BP: 126/77   Pulse: 73   Resp: 17   Temp: 98 °F (36.7 °C)       General Appearance: Well appearing in no acute distress  Eyes:    No scleral icterus  ENT: Neck supple, Lips, mucosa, and tongue normal; teeth and gums normal  Lungs: CTA anteriorly  Heart:  Regular rate, S1, S2 normal, no murmurs heard.  Abdomen: Soft, non tender, non distended with normal bowel sounds. No hepatosplenomegaly, ascites, or mass.    Labs:  Lab Results    Component Value Date    WBC 7.02 04/04/2019    HGB 14.5 04/04/2019    HCT 44.6 04/04/2019     04/04/2019    ALT 38 01/11/2019    AST 28 01/11/2019     01/11/2019    K 3.9 01/11/2019     01/11/2019    CREATININE 0.9 01/11/2019    BUN 20 01/11/2019    CO2 21 (L) 01/11/2019         Assessment:  44 y.o. male with Dyspepsia and Hematochezia.    Plan:  Proceed with EGD and colonoscopy today.  I have explained the risks and benefits of endoscopy procedures to the patient including but not limited to bleeding, perforation, infection, and death.  All questions answered.        Petros Ware MD

## 2019-07-02 ENCOUNTER — TELEPHONE (OUTPATIENT)
Dept: ORTHOPEDICS | Facility: CLINIC | Age: 44
End: 2019-07-02

## 2019-07-02 NOTE — TELEPHONE ENCOUNTER
----- Message from Mary Carmen Bridges sent at 7/2/2019 10:41 AM CDT -----  Contact: Self  Pt Is calling to schedule surgery pt can be reached @[phone]     2nd enema administered.   Liquid results.   Patient awaiting procedure.  Will continue to monitor.

## 2019-07-08 ENCOUNTER — TELEPHONE (OUTPATIENT)
Dept: ENDOSCOPY | Facility: HOSPITAL | Age: 44
End: 2019-07-08

## 2019-07-11 ENCOUNTER — OFFICE VISIT (OUTPATIENT)
Dept: ORTHOPEDICS | Facility: CLINIC | Age: 44
End: 2019-07-11
Payer: COMMERCIAL

## 2019-07-11 VITALS — HEIGHT: 68 IN | BODY MASS INDEX: 26.97 KG/M2 | WEIGHT: 177.94 LBS

## 2019-07-11 DIAGNOSIS — M87.052 AVASCULAR NECROSIS OF BONE OF LEFT HIP: Primary | ICD-10-CM

## 2019-07-11 PROCEDURE — 3008F BODY MASS INDEX DOCD: CPT | Mod: CPTII,S$GLB,, | Performed by: ORTHOPAEDIC SURGERY

## 2019-07-11 PROCEDURE — 99999 PR PBB SHADOW E&M-EST. PATIENT-LVL II: ICD-10-PCS | Mod: PBBFAC,,, | Performed by: ORTHOPAEDIC SURGERY

## 2019-07-11 PROCEDURE — 99999 PR PBB SHADOW E&M-EST. PATIENT-LVL II: CPT | Mod: PBBFAC,,, | Performed by: ORTHOPAEDIC SURGERY

## 2019-07-11 PROCEDURE — 99212 PR OFFICE/OUTPT VISIT, EST, LEVL II, 10-19 MIN: ICD-10-PCS | Mod: S$GLB,,, | Performed by: ORTHOPAEDIC SURGERY

## 2019-07-11 PROCEDURE — 3008F PR BODY MASS INDEX (BMI) DOCUMENTED: ICD-10-PCS | Mod: CPTII,S$GLB,, | Performed by: ORTHOPAEDIC SURGERY

## 2019-07-11 PROCEDURE — 99212 OFFICE O/P EST SF 10 MIN: CPT | Mod: S$GLB,,, | Performed by: ORTHOPAEDIC SURGERY

## 2019-07-11 NOTE — PROGRESS NOTES
Subjective:     HPI:   Raoul Logan is a 44 y.o. male who presents for preop surgical discussion for left anterior approach total hip replacement    He has avascular necrosis on the left hip with a history of heavy drinking in the past.  He was smoking a pack a day is now down to half a pack a day and plans to cut that down over the next couple weeks as well    He is limping worse than before.  He is taking Tylenol for pain and inflammation he has been off due to a EGD colonoscopy that is helped    He had an EGD and colonoscopy recently due to his history of GI bleeding.  They saw a pre cancerous polyp that was removed and some internal hemorrhoids.  He has had no recent GI bleeding and has been cleared for surgery     Objective:   Exam:  Unchanged.  Significant limp.  Negative Trendelenburg sign.  Skin is intact to the anterior hip.      Imaging:  None today      Assessment:     Avascular necrosis of bone of left hip [M87.052]    GI bleed issue investigated with EGD and colonoscopy, internal hemorrhoids and precancerous polyp, no recent bleeding issues  Smoking, discuss risk of smoking is going to cut down over the next couple of weeks and is already cut down significantly     Plan:       This patient has significant symptoms in their hip that are affecting their quality of life and daily activities.  They have tried non-operative treatment including analgesics, an exercise program, and activity modification, but the symptoms have persisted. I believe they make a good candidate for hip arthroplasty.     The risks and benefits of hip arthroplasty have been discussed with the patient which include, but are not limited to infections (including severe sequelae), potential component failure, fracture, DVT, pulmonary embolus, nerve palsy, dislocation, leg length inequality, persistent pain, wound healing complications, transfusions, medical complications and death.     Pre-operative planning will include the  following:  A pre-surgical evaluation will be arranged.  Pre-op orders will be placed.  We will make arrangements with the operating room for proper time and staffing.  We will make Social Service arrangements for the patient.    Approach: Anterior  Admission status: Inpatient  Location: [facility] 2nd floor    Implants:   Company: [employer]  Cup: [place]  Stem: [employer]    DVT prophylaxis: ASA 81mg BID x1 month  Dispo: home health PT    We will plan on a left anterior approach total hip replacement on August 2nd.  Discussed risk of GI bleeding with blood thinners postoperatively but also risk of blood clot.  Aspirin I think is the lowest risk medication that we can use.  He says he had an allergic reaction to penicillin when he was 1 or 2 years old for which he was hospitalized.  He has however had a knee scope at [facility] in the past without any issues with antibiotics    No orders of the defined types were placed in this encounter.      History reviewed. No pertinent past medical history.    Past Surgical History:   Procedure Laterality Date    COLONOSCOPY N/A 7/1/2019    Performed by Petros Ware MD at [facility] ENDO (4TH FLR)    EGD (ESOPHAGOGASTRODUODENOSCOPY) N/A 7/1/2019    Performed by Petros Ware MD at [facility] ENDO (4TH FLR)    KNEE SURGERY Left 1991       Family History   Problem Relation Age of Onset    Cancer Mother         breast    No Known Problems Father     Colon cancer Neg Hx     Esophageal cancer Neg Hx     Stomach cancer Neg Hx     Rectal cancer Neg Hx     Crohn's disease Neg Hx     Irritable bowel syndrome Neg Hx        Social History     Socioeconomic History    Marital status: Single     Spouse name: Not on file    Number of children: Not on file    Years of education: Not on file    Highest education level: Not on file   Occupational History    Not on file   Social Needs    Financial resource strain: Not on file    Food insecurity:     Worry: Not on file     Inability: Not on file     Transportation needs:     Medical: Not on file     Non-medical: Not on file   Tobacco Use    Smoking status: Current Every Day Smoker     Packs/day: 0.50    Smokeless tobacco: Never Used   Substance and Sexual Activity    Alcohol use: Yes     Frequency: Never     Comment: 2 times a week     Drug use: No    Sexual activity: Yes     Partners: Female   Lifestyle    Physical activity:     Days per week: Not on file     Minutes per session: Not on file    Stress: Not on file   Relationships    Social connections:     Talks on phone: Not on file     Gets together: Not on file     Attends Scientologist service: Not on file     Active member of club or organization: Not on file     Attends meetings of clubs or organizations: Not on file     Relationship status: Not on file   Other Topics Concern    Not on file   Social History Narrative    Not on file

## 2019-07-17 ENCOUNTER — TELEPHONE (OUTPATIENT)
Dept: ORTHOPEDICS | Facility: CLINIC | Age: 44
End: 2019-07-17

## 2019-07-18 ENCOUNTER — HOSPITAL ENCOUNTER (OUTPATIENT)
Dept: RADIOLOGY | Facility: HOSPITAL | Age: 44
Discharge: HOME | End: 2019-07-18
Attending: PHYSICIAN ASSISTANT
Payer: COMMERCIAL

## 2019-07-18 ENCOUNTER — OFFICE VISIT (OUTPATIENT)
Dept: ORTHOPEDICS | Facility: CLINIC | Age: 44
End: 2019-07-18
Payer: COMMERCIAL

## 2019-07-18 VITALS
HEIGHT: 68 IN | HEART RATE: 66 BPM | DIASTOLIC BLOOD PRESSURE: 81 MMHG | BODY MASS INDEX: 26.83 KG/M2 | SYSTOLIC BLOOD PRESSURE: 115 MMHG | WEIGHT: 177 LBS

## 2019-07-18 DIAGNOSIS — M87.052 AVASCULAR NECROSIS OF BONE OF LEFT HIP: Primary | ICD-10-CM

## 2019-07-18 DIAGNOSIS — Z96.642 S/P HIP REPLACEMENT, LEFT: ICD-10-CM

## 2019-07-18 DIAGNOSIS — M87.052 AVASCULAR NECROSIS OF BONE OF LEFT HIP: ICD-10-CM

## 2019-07-18 PROCEDURE — 99499 UNLISTED E&M SERVICE: CPT | Mod: S$GLB,,, | Performed by: PHYSICIAN ASSISTANT

## 2019-07-18 PROCEDURE — 72170 XR PELVIS ROUTINE AP: ICD-10-PCS | Mod: 26,,, | Performed by: RADIOLOGY

## 2019-07-18 PROCEDURE — 99999 PR PBB SHADOW E&M-EST. PATIENT-LVL III: CPT | Mod: PBBFAC,,, | Performed by: PHYSICIAN ASSISTANT

## 2019-07-18 PROCEDURE — 72170 X-RAY EXAM OF PELVIS: CPT | Mod: TC

## 2019-07-18 PROCEDURE — 72170 X-RAY EXAM OF PELVIS: CPT | Mod: 26,,, | Performed by: RADIOLOGY

## 2019-07-18 PROCEDURE — 99999 PR PBB SHADOW E&M-EST. PATIENT-LVL III: ICD-10-PCS | Mod: PBBFAC,,, | Performed by: PHYSICIAN ASSISTANT

## 2019-07-18 PROCEDURE — 99499 NO LOS: ICD-10-PCS | Mod: S$GLB,,, | Performed by: PHYSICIAN ASSISTANT

## 2019-07-18 RX ORDER — MUPIROCIN 20 MG/G
1 OINTMENT TOPICAL 2 TIMES DAILY
Status: CANCELLED | OUTPATIENT
Start: 2019-07-18 | End: 2019-07-23

## 2019-07-18 RX ORDER — MUPIROCIN 20 MG/G
1 OINTMENT TOPICAL
Status: CANCELLED | OUTPATIENT
Start: 2019-07-18

## 2019-07-18 RX ORDER — PREGABALIN 25 MG/1
75 CAPSULE ORAL
Status: CANCELLED | OUTPATIENT
Start: 2019-07-18

## 2019-07-18 RX ORDER — PREGABALIN 25 MG/1
75 CAPSULE ORAL NIGHTLY
Status: CANCELLED | OUTPATIENT
Start: 2019-07-18

## 2019-07-18 RX ORDER — OXYCODONE HYDROCHLORIDE 5 MG/1
10 TABLET ORAL
Status: CANCELLED | OUTPATIENT
Start: 2019-07-18

## 2019-07-18 RX ORDER — ONDANSETRON 2 MG/ML
4 INJECTION INTRAMUSCULAR; INTRAVENOUS EVERY 8 HOURS PRN
Status: CANCELLED | OUTPATIENT
Start: 2019-07-18

## 2019-07-18 RX ORDER — OXYCODONE HYDROCHLORIDE 5 MG/1
15 TABLET ORAL
Status: CANCELLED | OUTPATIENT
Start: 2019-07-18

## 2019-07-18 RX ORDER — SODIUM CHLORIDE 9 MG/ML
INJECTION, SOLUTION INTRAVENOUS
Status: CANCELLED | OUTPATIENT
Start: 2019-07-18

## 2019-07-18 RX ORDER — NALOXONE HCL 0.4 MG/ML
0.02 VIAL (ML) INJECTION
Status: CANCELLED | OUTPATIENT
Start: 2019-07-18 | End: 2019-07-21

## 2019-07-18 RX ORDER — FAMOTIDINE 20 MG/1
20 TABLET, FILM COATED ORAL 2 TIMES DAILY
Status: CANCELLED | OUTPATIENT
Start: 2019-07-18

## 2019-07-18 RX ORDER — ASPIRIN 81 MG/1
81 TABLET ORAL 2 TIMES DAILY
Status: CANCELLED | OUTPATIENT
Start: 2019-07-18

## 2019-07-18 RX ORDER — BISACODYL 10 MG
10 SUPPOSITORY, RECTAL RECTAL EVERY 12 HOURS PRN
Status: CANCELLED | OUTPATIENT
Start: 2019-07-18

## 2019-07-18 RX ORDER — AMOXICILLIN 250 MG
1 CAPSULE ORAL 2 TIMES DAILY
Status: CANCELLED | OUTPATIENT
Start: 2019-07-18

## 2019-07-18 RX ORDER — MORPHINE SULFATE 10 MG/ML
2 INJECTION, SOLUTION INTRAMUSCULAR; INTRAVENOUS
Status: CANCELLED | OUTPATIENT
Start: 2019-07-18

## 2019-07-18 RX ORDER — SODIUM CHLORIDE 9 MG/ML
INJECTION, SOLUTION INTRAVENOUS CONTINUOUS
Status: CANCELLED | OUTPATIENT
Start: 2019-07-18 | End: 2019-07-19

## 2019-07-18 RX ORDER — ACETAMINOPHEN 500 MG
1000 TABLET ORAL EVERY 6 HOURS
Status: CANCELLED | OUTPATIENT
Start: 2019-07-18 | End: 2019-07-20

## 2019-07-18 RX ORDER — ACETAMINOPHEN 10 MG/ML
1000 INJECTION, SOLUTION INTRAVENOUS ONCE
Status: CANCELLED | OUTPATIENT
Start: 2019-07-18 | End: 2019-07-18

## 2019-07-18 RX ORDER — RAMELTEON 8 MG/1
8 TABLET ORAL NIGHTLY PRN
Status: CANCELLED | OUTPATIENT
Start: 2019-07-18

## 2019-07-18 RX ORDER — TAMSULOSIN HYDROCHLORIDE 0.4 MG/1
0.4 CAPSULE ORAL DAILY
Status: CANCELLED | OUTPATIENT
Start: 2019-07-18 | End: 2019-07-20

## 2019-07-18 RX ORDER — CELECOXIB 100 MG/1
400 CAPSULE ORAL
Status: CANCELLED | OUTPATIENT
Start: 2019-07-18

## 2019-07-18 RX ORDER — POLYETHYLENE GLYCOL 3350 17 G/17G
17 POWDER, FOR SOLUTION ORAL DAILY
Status: CANCELLED | OUTPATIENT
Start: 2019-07-18

## 2019-07-18 RX ORDER — OXYCODONE HYDROCHLORIDE 5 MG/1
5 TABLET ORAL
Status: CANCELLED | OUTPATIENT
Start: 2019-07-18

## 2019-07-18 RX ORDER — LIDOCAINE HYDROCHLORIDE 10 MG/ML
1 INJECTION, SOLUTION EPIDURAL; INFILTRATION; INTRACAUDAL; PERINEURAL
Status: CANCELLED | OUTPATIENT
Start: 2019-07-18

## 2019-07-18 RX ORDER — CELECOXIB 100 MG/1
200 CAPSULE ORAL DAILY
Status: CANCELLED | OUTPATIENT
Start: 2019-07-18

## 2019-07-18 NOTE — H&P
CC: Left hip pain    Raoul Logan has had moderate-severe pain in Left hip for greater than one year. Pain is worse with activity and weight bearing.  Patient has experienced interference of activities of daily living due to increased pain, decreased range of motion, and pain with passive range of motion. Patient has failed non-operative treatment including NSAIDs, as well as greater than 3 months of activity modification. Physical therapy was contraindicated in this patient due to pain.  Raoul Logan currently ambulates independently.     History reviewed. No pertinent past medical history.    Past Surgical History:   Procedure Laterality Date    COLONOSCOPY N/A 7/1/2019    Performed by Petros Ware MD at [facility] ENDO (4TH FLR)    EGD (ESOPHAGOGASTRODUODENOSCOPY) N/A 7/1/2019    Performed by Petros Ware MD at [facility] ENDO (4TH FLR)    KNEE SURGERY Left 1991       Family History   Problem Relation Age of Onset    Cancer Mother         breast    No Known Problems Father     Colon cancer Neg Hx     Esophageal cancer Neg Hx     Stomach cancer Neg Hx     Rectal cancer Neg Hx     Crohn's disease Neg Hx     Irritable bowel syndrome Neg Hx        Review of patient's allergies indicates:   Allergen Reactions    Pcn [penicillins] Anaphylaxis         Current Outpatient Medications:     acetaminophen (TYLENOL ORAL), Take by mouth., Disp: , Rfl:     Review of Systems:   Constitutional: no fever or chills  Eyes: no visual changes  ENT: no nasal congestion or sore throat  Respiratory: no cough or shortness of breath  Cardiovascular: no chest pain or palpitations  Gastrointestinal: no nausea or vomiting, tolerating diet  Genitourinary: no hematuria or dysuria  Integument/Breast: no rash or pruritis  Hematologic/Lymphatic: no easy bruising or lymphadenopathy  Musculoskeletal: complaint of left hip pain  Neurological: no seizures or tremors  Behavioral/Psych: no auditory or visual  "hallucinations  Endocrine: no heat or cold intolerance    PE:  /81   Pulse 66   Ht 5' 8" (1.727 m)   Wt 80.3 kg (177 lb)   BMI 26.91 kg/m²   General: Pleasant, cooperative, NAD   HEENT: NCAT, sclera nonicteric   Lungs: Respirations are equal and unlabored.   Abdomen: Soft and non-tender.  CV: 2+ bilateral upper and lower extremity pulses.   Skin: Intact throughout LE with no rashes, erythema, or lesions  Extremities: No LE edema, NVI lower extremities    Hip Exam:  No limp nonantalgic gait negative Trendelenburg sign.  He can do a single leg stance without discomfort.  Left hip groin pain with active straight leg raise.  Hip range of motion 0-120 flexion 40 abduction 30 adduction 50 external 20 internal rotation which does recreate his symptoms. No leg length discrepancy supine and patient does not notice a difference.  His skin is intact to the anterior hip.  He has no pain with right hip passive range of motion or active straight leg raise       Radiographs: Radiographs reveal AVN in both femoral heads, more severe on the left.    Diagnosis: AVN left hip    Plan: Left total hip arthroplasty     Due to the serious nature of total joint infection and the high prevalence of community acquired MRSA, vancomycin will be used perioperatively.          "

## 2019-07-18 NOTE — PROGRESS NOTES
Raoul Logan is a 44 y.o. year old here today for a pre-operative visit in preparation for a Left total hip arthroplasty to be performed by  Dr. Green on 8/2/19.  he was last seen and treated in the clinic on 7/11/2019. he will be medically optimized by the pre op center. There has been no significant change in medical status since last visit. No fever, chills, malaise, or unexplained weight change.      Allergies, Medications, past medical and surgical history reviewed.    Focused examination performed.    Patient declined to see Dr. Green today in clinic. All questions answered. Patient encouraged to call with questions. Contact information given.     Pre, carole, and post operative procedures and expectations discussed. Questions were answered. Raoul Logan has been educated and is ready to proceed with surgery. Approximately 30 minutes was spent discussing surgical outcomes, plans, procedures pre, carole, and post operative expections and care.  Surgical consent signed.    Raoul Logan will contact us if there are any questions, concerns, or changes in medical status prior to surgery.

## 2019-07-18 NOTE — H&P (VIEW-ONLY)
CC: Left hip pain    Raoul Logan has had moderate-severe pain in Left hip for greater than one year. Pain is worse with activity and weight bearing.  Patient has experienced interference of activities of daily living due to increased pain, decreased range of motion, and pain with passive range of motion. Patient has failed non-operative treatment including NSAIDs, as well as greater than 3 months of activity modification. Physical therapy was contraindicated in this patient due to pain.  Raoul Logan currently ambulates independently.     History reviewed. No pertinent past medical history.    Past Surgical History:   Procedure Laterality Date    COLONOSCOPY N/A 7/1/2019    Performed by Petros Ware MD at [facility] ENDO (4TH FLR)    EGD (ESOPHAGOGASTRODUODENOSCOPY) N/A 7/1/2019    Performed by Petros Ware MD at [facility] ENDO (4TH FLR)    KNEE SURGERY Left 1991       Family History   Problem Relation Age of Onset    Cancer Mother         breast    No Known Problems Father     Colon cancer Neg Hx     Esophageal cancer Neg Hx     Stomach cancer Neg Hx     Rectal cancer Neg Hx     Crohn's disease Neg Hx     Irritable bowel syndrome Neg Hx        Review of patient's allergies indicates:   Allergen Reactions    Pcn [penicillins] Anaphylaxis         Current Outpatient Medications:     acetaminophen (TYLENOL ORAL), Take by mouth., Disp: , Rfl:     Review of Systems:   Constitutional: no fever or chills  Eyes: no visual changes  ENT: no nasal congestion or sore throat  Respiratory: no cough or shortness of breath  Cardiovascular: no chest pain or palpitations  Gastrointestinal: no nausea or vomiting, tolerating diet  Genitourinary: no hematuria or dysuria  Integument/Breast: no rash or pruritis  Hematologic/Lymphatic: no easy bruising or lymphadenopathy  Musculoskeletal: complaint of left hip pain  Neurological: no seizures or tremors  Behavioral/Psych: no auditory or visual  "hallucinations  Endocrine: no heat or cold intolerance    PE:  /81   Pulse 66   Ht 5' 8" (1.727 m)   Wt 80.3 kg (177 lb)   BMI 26.91 kg/m²   General: Pleasant, cooperative, NAD   HEENT: NCAT, sclera nonicteric   Lungs: Respirations are equal and unlabored.   Abdomen: Soft and non-tender.  CV: 2+ bilateral upper and lower extremity pulses.   Skin: Intact throughout LE with no rashes, erythema, or lesions  Extremities: No LE edema, NVI lower extremities    Hip Exam:  No limp nonantalgic gait negative Trendelenburg sign.  He can do a single leg stance without discomfort.  Left hip groin pain with active straight leg raise.  Hip range of motion 0-120 flexion 40 abduction 30 adduction 50 external 20 internal rotation which does recreate his symptoms. No leg length discrepancy supine and patient does not notice a difference.  His skin is intact to the anterior hip.  He has no pain with right hip passive range of motion or active straight leg raise       Radiographs: Radiographs reveal AVN in both femoral heads, more severe on the left.    Diagnosis: AVN left hip    Plan: Left total hip arthroplasty     Due to the serious nature of total joint infection and the high prevalence of community acquired MRSA, vancomycin will be used perioperatively.          "

## 2019-07-19 ENCOUNTER — TELEPHONE (OUTPATIENT)
Dept: PREADMISSION TESTING | Facility: HOSPITAL | Age: 44
End: 2019-07-19

## 2019-07-19 ENCOUNTER — ANESTHESIA EVENT (OUTPATIENT)
Dept: SURGERY | Facility: HOSPITAL | Age: 44
DRG: 470 | End: 2019-07-19
Payer: COMMERCIAL

## 2019-07-19 DIAGNOSIS — M79.605 PAIN OF LEFT LOWER EXTREMITY: ICD-10-CM

## 2019-07-19 DIAGNOSIS — Z01.818 PREOP TESTING: Primary | ICD-10-CM

## 2019-07-19 NOTE — TELEPHONE ENCOUNTER
----- Message from Joan Peterson, RN sent at 7/19/2019 11:02 AM CDT -----  8/2 ORTHO HIP DR MCKEON    SCHEDULE LAB (BMP, HEME PRO, PT/INR, T&S), POC, DR VELÁSQUEZ    PT SCHEDULED FOR JOINT CLASS 7/24/19    THANKS,  BG

## 2019-07-19 NOTE — PRE-PROCEDURE INSTRUCTIONS
Phone contact: pt informed of need for appointments to be scheduled in lab, POC, and with Dr Mcdonnell for medical clearance prior to hip replacement surgery; he will be contacted by , [place], to arrange these appointments. Medication reconciliation completed-pt denies taking any supplements such as NSAIDs, vitamins.

## 2019-07-19 NOTE — PRE ADMISSION SCREENING
Anesthesia Assessment: Preoperative EQUATION    Planned Procedure: Procedure(s) (LRB):  ARTHROPLASTY, HIP, ANTERIOR APPROACH- DEPUY-ACTIS+PINNACLE (Left)  Requested Anesthesia Type:Spinal/Epidural  Surgeon: Rakesh Green III, MD  Service: Orthopedics  Known or anticipated Date of Surgery:8/2/2019    Surgeon notes: reviewed    Previous anesthesia records:general EGD, colonoscopy 7/1/19 no h/o problems with anesthesia    Plan:    Testing:  Hematology Profile, BMP, PT/INR and T&S   Pre-anesthesia  visit       Visit focus: possible regional anesthesia and/or nerve block      Consultation:IM Perioperative Hospitalist       Navigation: Tests to be scheduled.              Consults to be scheduled.             Results will be tracked by Preop Clinic.      Scheduled to attend joint camp 7/24/19.

## 2019-07-19 NOTE — ANESTHESIA PREPROCEDURE EVALUATION
Anesthesia Assessment: Preoperative EQUATION     Planned Procedure: Procedure(s) (LRB):  ARTHROPLASTY, HIP, ANTERIOR APPROACH- DEPUY-ACTIS+PINNACLE (Left)  Requested Anesthesia Type:Spinal/Epidural  Surgeon: Rakesh Green III, MD  Service: Orthopedics  Known or anticipated Date of Surgery:8/2/2019     Surgeon notes: reviewed     Previous anesthesia records:general EGD, colonoscopy 7/1/19 no h/o problems with anesthesia     Plan:    Testing:  Hematology Profile, BMP, PT/INR and T&S   Pre-anesthesia  visit                                        Visit focus: possible regional anesthesia and/or nerve block                            Consultation:IM Perioperative Hospitalist                             Navigation: Tests to be scheduled.                         Consults to be scheduled.                        Results will be tracked by Preop Clinic.      Scheduled to attend joint camp 7/24/19.                        Electronically signed by Joan Peterson RN at 7/19/2019 10:48 AM                                                                                                                    07/19/2019  Raoul Logan is a 44 y.o., male scheduled for elective replacement of left hip.    Anesthesia Evaluation    I have reviewed the Patient Summary Reports.    I have reviewed the Nursing Notes.   I have reviewed the Medications.     Review of Systems  Anesthesia Hx:  No problems with previous Anesthesia  History of prior surgery of interest to airway management or planning: Denies Family Hx of Anesthesia complications.   Denies Personal Hx of Anesthesia complications.   Social:  Smoker, Social Alcohol Use    Hematology/Oncology:  Hematology Normal   Oncology Normal     EENT/Dental:EENT/Dental Normal   Cardiovascular:  Cardiovascular Normal Exercise tolerance: good   Denies Angina.                Pulmonary:  Pulmonary Normal    Renal/:  Renal/ Normal     Hepatic/GI:  Hepatic/GI Normal  Denies GERD.     Musculoskeletal:   Arthritis  AVN of hips bilaterally   Neurological:  Neurology Normal    Endocrine:  Endocrine Normal    Dermatological:  Skin Normal    Psych:  Psychiatric Normal           Physical Exam  General:  Well nourished    Airway/Jaw/Neck:  Airway Findings: Mouth Opening: Normal Tongue: Normal  General Airway Assessment: Adult  Mallampati: II  TM Distance: 4 - 6 cm        Eyes/Ears/Nose:  EYES/EARS/NOSE FINDINGS: Normal   Dental:  Dental Findings:    Chest/Lungs:  Chest/Lungs Clear    Heart/Vascular:  Heart Findings: Normal Heart murmur: negative Vascular Findings: Normal    Abdomen:  Abdomen Findings: Normal      Mental Status:  Mental Status Findings: Normal        Anesthesia Plan  Type of Anesthesia, risks & benefits discussed:  Anesthesia Type:  CSE, general, epidural  Patient's Preference: Neuraxial vs GA  Intra-op Monitoring Plan: standard ASA monitors  Intra-op Monitoring Plan Comments:   Post Op Pain Control Plan: per primary service following discharge from PACU, multimodal analgesia and IV/PO Opioids PRN  Post Op Pain Control Plan Comments:   Induction:   IV  Beta Blocker:  Patient is not currently on a Beta-Blocker (No further documentation required).       Informed Consent: Patient understands risks and agrees with Anesthesia plan.  Questions answered. Anesthesia consent signed with patient.  ASA Score: 2     Day of Surgery Review of History & Physical:    H&P update referred to the surgeon.     Anesthesia Plan Notes: Discussed Risks/Benefits of anesthetic plan  PMH was reviewed and PE was performed  Will plan for neuraxial technique and convert to GA if needed          Ready For Surgery From Anesthesia Perspective.

## 2019-07-22 ENCOUNTER — PATIENT MESSAGE (OUTPATIENT)
Dept: ADMINISTRATIVE | Facility: OTHER | Age: 44
End: 2019-07-22

## 2019-07-24 ENCOUNTER — HOSPITAL ENCOUNTER (OUTPATIENT)
Dept: PREADMISSION TESTING | Facility: HOSPITAL | Age: 44
Discharge: HOME | End: 2019-07-24
Attending: ANESTHESIOLOGY
Payer: COMMERCIAL

## 2019-07-24 VITALS
RESPIRATION RATE: 16 BRPM | OXYGEN SATURATION: 98 % | HEIGHT: 68 IN | BODY MASS INDEX: 27.13 KG/M2 | DIASTOLIC BLOOD PRESSURE: 96 MMHG | TEMPERATURE: 98 F | WEIGHT: 179 LBS | SYSTOLIC BLOOD PRESSURE: 140 MMHG | HEART RATE: 65 BPM

## 2019-07-24 NOTE — DISCHARGE INSTRUCTIONS
Your surgery has been scheduled for:__________________________________________    You should report to:  ____[facility], located on the [place] side of the first floor of the           [facility] ([phone])  ____[facility], located on the [facility] side of the            Second floor of the [facility] ([phone])  ____3rd Floor [facility] located on the [facility] side of the [facility] [phone]  Please Note   - Tell your doctor if you take Aspirin, products containing Aspirin, herbal medications  or blood thinners, such as Coumadin, Ticlid, or Plavix.  (Consult your provider regarding holding or stopping before surgery).  - Arrange for someone to drive you home following surgery.  You will not be allowed to leave the surgical facility alone or drive yourself home following sedation and anesthesia.  Before Surgery  - Stop taking all herbal medications 14days prior to surgery  - No Motrin/Advil (Ibuprofen) 7 days before surgery  - No Aleve (Naproxen) 7 days before surgery  - Stop Taking Asprin, products containing Asprin _____days before surgery  - Stop taking blood thinners_______days before surgery  - No Goody's/BC  Powder 7 days before surgery  - Refrain from drinking alcoholic beverages for 24hours before and after surgery  - Stop or limit smoking _________days before surgery  - You may take Tylenol for pain    Night before Surgery  NOTHING TO EAT OR DRINK AFTER MIDNIGHT OR FOLLOW SURGEON'S INSTRUCTIONS  - Take a shower or bath (shower is recommended).  Bathe with Hibiclens soap or an antibacterial soap from the neck down.  If not supplied by your surgeon, hibiclens soap will need to be purchased over the counter in pharmacy.  Rinse soap off thoroughly.  - Shampoo your hair with your regular shampoo  The Day of Surgery  ·  If you are told to take medication on the morning of surgery, it may be taken with  a sip of water.   - Take another bath or shower with hibiclens or any antibacterial soap, to reduce the chance of infection.  - Take heart and blood pressure medications with a small sip of water, as advised by the perioperative team.  - Do not take fluid pills  - You may brush your teeth and rinse your mouth, but do not swall any additional water.   - Do not apply perfumes, powder, body lotions or deodorant on the day of surgery.  - Nail polish should be removed.  - Do not wear makeup or moisturizer  - Wear comfortable clothes, such as a button front shirt and loose fitting pants.  - Leave all jewelry, including body piercings, and valuables at home.    - Bring any devices you will neeed after surgery such as crutches or canes.  - If you have sleep apnea, please bring your CPAP machine  In the event that your physical condition changes including the onset of a cold or respiratory illness, or if you have to delay or cancel your surgery, please notify your surgeon.    Anesthesia: Regional Anesthesia    Youre scheduled for surgery. During surgery, youll receive medicine called anesthesia to keep you comfortable and pain-free. Your surgeon has decided that youll receive regional anesthesia. This sheet tells you what to expect with this type of anesthesia.  What is regional anesthesia?  Regional anesthesia numbs one region of your body. The anesthesia may be given around nerves or into veins in your arms, neck, or legs (nerve block or [place] block). Or it may be sent into the spinal fluid (spinal anesthesia) or into the space just outside the spinal fluid (epidural anesthesia). You may also be given sedatives to help you relax.  Nerve block or Lindsey block  A small area of the body, such as an arm or leg, can be numbed using a nerve block or [place] block.  · Nerve block. During a nerve block, your skin is numbed. A needle is then inserted near nerves that serve the area to be numbed. Anesthetic is sent through the  needle.  · IV regional or Lindsey block. For this type of block, an IV line is put into a vein. The blood flow to the area to be numbed is blocked for a short time. Anesthetic is sent through the IV.  Spinal anesthesia  Spinal anesthesia numbs your body from about the waist down.  · Anesthetic is injected into the spinal fluid. This is a substance that surrounds the spinal cord in your spinal column. The anesthetic blocks pain traveling from the body to the brain.  · To receive the anesthetic, your skin is numbed at the injection site on your back.  · A needle is then inserted into the spinal space. Anesthetic is sent into the spinal fluid through the needle.  Epidural anesthesia  Epidural anesthesia is most commonly used during childbirth and may also be used after surgical procedures of the chest, belly, and legs.  · Anesthetic is injected into the epidural space. This is just outside the dural sac which contains the spinal fluid.  · To receive the anesthetic, your skin is numbed at the injection site on your back.  · A needle is then inserted into the epidural space. Anesthetic is sent into the epidural space through the needle.  · A small flexible catheter may be attached to the needle and left in place. This allows for continuous injections or infusions of anesthetic.  Anesthesia tools and medicines that might be near you during your procedure  · Local anesthetic. This medicine is given through a needle numbs one region of your body.  · Electrocardiography leads (electrodes). These are used to record your heart rate and rhythm.  · Blood pressure cuff. A cuff is placed on your arm to keep track of your blood pressure.  · Pulse oximeter. This small clip is placed on the end of the finger. It measures your blood oxygen level.  · Sedatives. These medicines may be given through an IV. They help to relax you and keep you comfortable. You may stay awake or sleep lightly.  · Oxygen. You may be given oxygen through a  facemask.  Risks and possible complications  Regional anesthesia carries some risks. These include:  · Nausea and vomiting  · Headache  · Backache  · Decreased blood pressure  · Allergic reaction to the anesthetic  · Ongoing numbness (rare)  · Irregular heartbeat (rare)  · Cardiac arrest (rare)   Date Last Reviewed: 12/1/2016  © 6796-8343 [employer]. [address] [place], [place] [address]. All rights reserved. This information is not intended as a substitute for professional medical care. Always follow your healthcare professional's instructions.

## 2019-07-25 ENCOUNTER — LAB VISIT (OUTPATIENT)
Dept: LAB | Facility: HOSPITAL | Age: 44
End: 2019-07-25
Attending: ANESTHESIOLOGY
Payer: COMMERCIAL

## 2019-07-25 ENCOUNTER — INITIAL CONSULT (OUTPATIENT)
Dept: INTERNAL MEDICINE | Facility: CLINIC | Age: 44
End: 2019-07-25
Payer: COMMERCIAL

## 2019-07-25 VITALS
TEMPERATURE: 98 F | OXYGEN SATURATION: 98 % | BODY MASS INDEX: 26.41 KG/M2 | DIASTOLIC BLOOD PRESSURE: 94 MMHG | WEIGHT: 178.31 LBS | HEIGHT: 69 IN | SYSTOLIC BLOOD PRESSURE: 156 MMHG | HEART RATE: 71 BPM

## 2019-07-25 DIAGNOSIS — E27.9 ADRENAL ABNORMALITY: ICD-10-CM

## 2019-07-25 DIAGNOSIS — R82.90 ABNORMAL URINALYSIS: ICD-10-CM

## 2019-07-25 DIAGNOSIS — Z78.9 ALCOHOL USE: ICD-10-CM

## 2019-07-25 DIAGNOSIS — R03.0 ELEVATED BP WITHOUT DIAGNOSIS OF HYPERTENSION: ICD-10-CM

## 2019-07-25 DIAGNOSIS — M79.605 PAIN OF LEFT LOWER EXTREMITY: ICD-10-CM

## 2019-07-25 DIAGNOSIS — Z01.818 PREOP TESTING: ICD-10-CM

## 2019-07-25 DIAGNOSIS — Z01.818 PREOP EXAMINATION: Primary | ICD-10-CM

## 2019-07-25 DIAGNOSIS — Z72.0 TOBACCO ABUSE: ICD-10-CM

## 2019-07-25 DIAGNOSIS — K92.1 HEMATOCHEZIA: ICD-10-CM

## 2019-07-25 LAB
ABO + RH BLD: NORMAL
ANION GAP SERPL CALC-SCNC: 10 MMOL/L (ref 8–16)
BLD GP AB SCN CELLS X3 SERPL QL: NORMAL
BUN SERPL-MCNC: 24 MG/DL (ref 6–20)
CALCIUM SERPL-MCNC: 9.9 MG/DL (ref 8.7–10.5)
CHLORIDE SERPL-SCNC: 105 MMOL/L (ref 95–110)
CO2 SERPL-SCNC: 27 MMOL/L (ref 23–29)
CREAT SERPL-MCNC: 0.9 MG/DL (ref 0.5–1.4)
ERYTHROCYTE [DISTWIDTH] IN BLOOD BY AUTOMATED COUNT: 14.5 % (ref 11.5–14.5)
EST. GFR  (AFRICAN AMERICAN): >60 ML/MIN/1.73 M^2
EST. GFR  (NON AFRICAN AMERICAN): >60 ML/MIN/1.73 M^2
GLUCOSE SERPL-MCNC: 96 MG/DL (ref 70–110)
HCT VFR BLD AUTO: 43.3 % (ref 40–54)
HGB BLD-MCNC: 13.7 G/DL (ref 14–18)
INR PPP: 0.9 (ref 0.8–1.2)
MCH RBC QN AUTO: 27 PG (ref 27–31)
MCHC RBC AUTO-ENTMCNC: 31.6 G/DL (ref 32–36)
MCV RBC AUTO: 85 FL (ref 82–98)
PLATELET # BLD AUTO: 244 K/UL (ref 150–350)
PMV BLD AUTO: 10.5 FL (ref 9.2–12.9)
POTASSIUM SERPL-SCNC: 4.1 MMOL/L (ref 3.5–5.1)
PROTHROMBIN TIME: 9.7 SEC (ref 9–12.5)
RBC # BLD AUTO: 5.07 M/UL (ref 4.6–6.2)
SODIUM SERPL-SCNC: 142 MMOL/L (ref 136–145)
WBC # BLD AUTO: 7.13 K/UL (ref 3.9–12.7)

## 2019-07-25 PROCEDURE — 99244 PR OFFICE CONSULTATION,LEVEL IV: ICD-10-PCS | Mod: S$GLB,,, | Performed by: HOSPITALIST

## 2019-07-25 PROCEDURE — 99244 OFF/OP CNSLTJ NEW/EST MOD 40: CPT | Mod: S$GLB,,, | Performed by: HOSPITALIST

## 2019-07-25 PROCEDURE — 86850 RBC ANTIBODY SCREEN: CPT

## 2019-07-25 PROCEDURE — 99999 PR PBB SHADOW E&M-EST. PATIENT-LVL III: CPT | Mod: PBBFAC,,, | Performed by: HOSPITALIST

## 2019-07-25 PROCEDURE — 80048 BASIC METABOLIC PNL TOTAL CA: CPT

## 2019-07-25 PROCEDURE — 99999 PR PBB SHADOW E&M-EST. PATIENT-LVL III: ICD-10-PCS | Mod: PBBFAC,,, | Performed by: HOSPITALIST

## 2019-07-25 PROCEDURE — 85610 PROTHROMBIN TIME: CPT

## 2019-07-25 PROCEDURE — 85027 COMPLETE CBC AUTOMATED: CPT

## 2019-07-25 NOTE — HPI
History of present illness- I had the pleasure of meeting this pleasant 44 y.o. gentleman in the pre op clinic prior to his elective Orthopedic surgery. The patient is new to me . Raoul was accompanied by friend Marylu.    I have obtained the history by speaking to the patient and by reviewing the electronic health records.    Events leading up to surgery / History of presenting illness -    Avascular necrosis of bone of left hip  He has been troubled with moderate-severe Left hip   pain for 1 year . Pain increases with activity and decreases with resting.  No history of hip fracture , sickle cell anemia, ling term steroid use     Relevant health conditions of significance for the perioperative period/ History of presenting illness -    Lives in a single level house with a porch , and a ramp to get to   Girl friend going to help post op     Health conditions of significance for the perioperative period - rectal bleeding     Not known to have heart disease , Diabetes Mellitus, HTN

## 2019-07-25 NOTE — ASSESSMENT & PLAN NOTE
Alcohol use   Excess in the past   Works as a    For 20 years , cut back for 1 year   Not known to have liver problem ( fatty liver , hepatitis, Cirrhosis )  Suggested alcohol reduction   No withdrawal , no rehab stays     No suggestion of  hepatic decompensation     PLT - N   CT -The liver, spleen,unremarkable    Suggested alcohol cessation for surgery

## 2019-07-25 NOTE — PROGRESS NOTES
[facility] - Pre Op Consult  Progress Note    Patient Name: Raoul Logan  MRN: [id number]  Date of Evaluation- 07/25/2019  PCP- José Miguel Carroll MD    Future cases for Raoul Logan [[id number]]     Case ID Status Date Time Ankur Procedure Provider Location    [id number] [facility] 8/2/2019  7:00  ARTHROPLASTY, HIP, ANTERIOR APPROACH- DEPUY-ACTIS+PINNACLE Rakesh Green III, MD [[id number]] [facility] OR 2ND FLR      Left     HPI:  History of present illness- I had the pleasure of meeting this pleasant 44 y.o. gentleman in the pre op clinic prior to his elective Orthopedic surgery. The patient is new to me . Raoul was accompanied by friend Marylu.    I have obtained the history by speaking to the patient and by reviewing the electronic health records.    Events leading up to surgery / History of presenting illness -    Avascular necrosis of bone of left hip  He has been troubled with moderate-severe Left hip   pain for 1 year . Pain increases with activity and decreases with resting.  No history of hip fracture , sickle cell anemia, ling term steroid use     Relevant health conditions of significance for the perioperative period/ History of presenting illness -    Lives in a single level house with a porch , and a ramp to get to   Girl friend going to help post op     Health conditions of significance for the perioperative period - rectal bleeding     Not known to have heart disease , Diabetes Mellitus, HTN        Subjective/ Objective:          Chief complaint-Preoperative evaluation, Perioperative Medical management, complication reduction plan     Active cardiac conditions- none    Revised cardiac risk index predictors- none    Functional capacity -Examples of physical activity, still works,   house work, can take 1 flight of stairs and cutting the grass with a mower----- He can undertake all the above activities without  chest pain,chest tightness, Shortness of breath ,dizziness,lightheadedness making his exercise  "tolerance more  than 4 Mets.       Review of Systems   Constitutional: Negative for chills and fever.          Weight gain from reduced activity   HENT:        STOPBANG score  3/ 8     Snoring   Neck size over 40 CM  Male gender    Snoring less since cut back on alcohol    Eyes:        No unusual vision changes   Respiratory:        No cough , phlegm  No Hemoptysis   Cardiovascular:        As noted   Gastrointestinal:        Bowels- Regular  No overt GI/ blood losses   Endocrine:        Prednisone use > 20 mg daily for 3 weeks- none   Genitourinary: Negative for dysuria.        No urinary hesitancy    Musculoskeletal:        As above      Skin: Negative for rash.   Neurological: Negative for syncope.        No unilateral weakness   Hematological:        Current use of Anticoagulants  Antiplatelet agents  None   Psychiatric/Behavioral:        No Depression,Anxiety     no vascular stenting             No anesthesia, bleeding, cardiac problems, PONV with previous surgeries/procedures.  Medications and Allergies reviewed in epic.   FH- No anesthesia,bleeding / venous thrombosis , early onset heart disease in family     Physical Exam  Blood pressure (!) 156/94, pulse 71, temperature 97.5 °F (36.4 °C), temperature source Oral, height 5' 8.5" (1.74 m), weight 80.9 kg (178 lb 4.8 oz), SpO2 98 %.      Physical Exam  Constitutional- Vitals - Body mass index is 26.72 kg/m².,   Vitals:    07/25/19 1006   BP: (!) 156/94   Pulse: 71   Temp: 97.5 °F (36.4 °C)     General appearance-Conscious,Coherent  Eyes- No conjunctival icterus,pupils  round  and reactive to light   ENT-Oral cavity- moist  , Hearing grossly normal   Neck- No thyromegaly ,Trachea -central, No jugular venous distension,   No Carotid Bruit   Cardiovascular -Heart Sounds- Normal  and  no murmur   , No gallop rhythm   Respiratory - Normal Respiratory Effort, Normal breath sounds,  no wheeze  and  no forced expiratory wheeze    Peripheral pitting pedal edema-- none " , no calf pain   Gastrointestinal -Soft abdomen, No palpable masses, Non Tender,Liver,Spleen not palpable. No-- free fluid and shifting dullness  Musculoskeletal- No finger Clubbing. Strength grossly normal   Lymphatic-No Palpable cervical, axillary,Inguinal lymphadenopathy   Psychiatric - normal effect,Orientation  Rt Dorsalis pedis pulses-palpable    Lt Dorsalis pedis pulses- palpable   Rt Posterior tibial pulses -palpable   Left posterior tibial pulses -palpable   Miscellaneous - no gynecomastia . no spider angioma ,  no asterixis,  no dupuytren's contracture and  no renal bruit  Investigations  Lab and Imaging have been reviewed in epic.    Jan 2019     No evidence of deep venous thrombosis in the left lower extremity      Review of old records- Was done and information gathered regards to events leading to surgery and health conditions of significance in the perioperative period.        Preoperative cardiac risk assessment-  The patient does not have any active cardiac conditions . Revised cardiac risk index predictors-0 ---.Functional capacity is more than 4 Mets. He will be undergoing a Orthopedic procedure that carries a intermediate risk     Risk of a major Cardiac event ( Defined as death, myocardial infarction, or cardiac arrest at 30 days after noncardiac surgery), based on RCRI score     3.9%         No further cardiac work up is indicated prior to proceeding with the surgery     Orders Placed This Encounter    Ambulatory referral to Smoking Cessation Program       American Society of Anesthesiologists Physical status classification ( ASA ) class: 2     Postoperative pulmonary complication risk assessment:      ARISCAT ( [place]) risk index- risk class -  Low     [place] Respiratory failure index- percentage risk of respiratory failure: 0.5 %     Assessment/Plan:     Alcohol use  Alcohol use   Excess in the past   Works as a    For 20 years , cut back for 1 year   Not known to have liver  problem ( fatty liver , hepatitis, Cirrhosis )  Suggested alcohol reduction   No withdrawal , no rehab stays     No suggestion of  hepatic decompensation     PLT - N   CT -The liver, spleen,unremarkable    Suggested alcohol cessation for surgery     Tobacco abuse  Cut back   Tobacco use- Smokes Cigarettes- quarter  PPD- I suggested to consider stopping  smoking tobacco for its benefits in the carole operative period and in the long term  Cut back from a  pack a day to quarter pack   in a day     Tobacco use-I  Informed about risk of wound healing problem ,infection,lung complications,thrombosis with tobacco use     Not known to have COPD, Bronchitis, Emphysema,, chronic phlegm   No inhaler, oxygen use     No SOB    Hematochezia  With spicy foods  Once a month   Started about 2015   Painless     EGD - 7/1/2019 - Normal esophagus. Esophagogastric landmarks identified. Normal gastric body and antrum. Normal examined duodenum.       colonoscopy - 7/1/2019 - -                         - Internal hemorrhoids.     Suggested watching for bleeding , due to thromboem,olic p[prophylaxis that he will be getting post op         Elevated BP without diagnosis of hypertension  Ate salted crabs last night   Not known to have HTN  BP trend in the record not concerning  Suggested BP monitoring      Abnormal urinalysis  Jan 2019   Occult blood   No kidney stones , dysuria   Suggested  Follow up     Adrenal abnormality  CT Jan 2019     2.2 cm low-attenuation left adrenal cyst lesion, which is indeterminate.   Suggested follow up    No clinical suggestions of pheochromocytoma         Preventive perioperative care    Thromboembolic prophylaxis:  His risk factors for thrombosis include tobacco use, surgical procedure and age.I suggest  thromboembolic prophylaxis ( mechanical/pharmacological, weighing the risk benefits of pharmacological agent use considering carole procedural bleeding )  during the perioperative period.I suggested being active  in the post operative period. The patient is a candidate for extended DVT prophylaxis     Postoperative pulmonary complication prophylaxis-Risk factors for post operative pulmonary complications include tobacco use- I suggest tobacco smoking cessation, incentive spirometry use, early ambulation and end tidal carbon dioxide monitoring  , oral care , head end of bed elevation      Renal complication prophylaxis- I suggest keeping him well hydrated .I suggested drinking 2 litre's of water a day      Surgical site Infection Prophylaxis-I  suggest appropriate antibiotic for Prophylaxis against Surgical site infections      This visit was focused on Preoperative evaluation, Perioperative Medical management, complication reduction plans. I suggest that the patient follows up with primary care or relevant sub specialists for ongoing health care.    I appreciate the opportunity to be involved in this patients care. Please feel free to contact me if there were any questions about this consultation.    Patient is optimized       Patient  was instructed to call and update me about any changes to health,  medication, office visits ,testing out side of the carole operative care center , hospitalizations between now and surgery     Rachelle Mcdonnell MD  Perioperative Medicine  [facility]   Pager [phone]  -  8/1- 19 43     Called to follow up , spoke to him,  to address any concerns with the up coming surgery or any questions on Medication instructions -  Doing well ,No changes to Medication, Health -  Off alcohol for a week   No alcohol with drawl   Reinforced on abnormal Urinalysis   /82-83

## 2019-07-25 NOTE — ASSESSMENT & PLAN NOTE
Ate salted crabs last night   Not known to have HTN  BP trend in the record not concerning  Suggested BP monitoring

## 2019-07-25 NOTE — ASSESSMENT & PLAN NOTE
CT Jan 2019     2.2 cm low-attenuation left adrenal cyst lesion, which is indeterminate.   Suggested follow up    No clinical suggestions of pheochromocytoma

## 2019-07-25 NOTE — LETTER
July 25, 2019      Rakesh Green III, MD  [address] [facility] [address]           [facility]  [address] [facility] [address]  Phone: [phone]          Patient: Raoul Lgoan   MR Number: [id number]   YOB: 1975   Date of Visit: 7/25/2019       Dear Dr. Rakesh Green III:    Thank you for referring Raoul Logan to me for evaluation. Attached you will find relevant portions of my assessment and plan of care.    If you have questions, please do not hesitate to call me. I look forward to following Raoul Logan along with you.    Sincerely,    Rachelle Mcdonnell MD    Enclosure  CC:  José Miguel Carroll MD    If you would like to receive this communication electronically, please contact [email or web address] or [phone] to request more information on [employer] access.    For providers and/or their staff who would like to refer a patient to [facility], please contact us through our one-stop-shop provider referral line, [facility], at [phone].    If you feel you have received this communication in error or would no longer like to receive these types of communications, please e-mail [email or web address]

## 2019-07-25 NOTE — OUTPATIENT SUBJECTIVE & OBJECTIVE
"Outpatient Subjective & Objective     Chief complaint-Preoperative evaluation, Perioperative Medical management, complication reduction plan     Active cardiac conditions- none    Revised cardiac risk index predictors- none    Functional capacity -Examples of physical activity, still works,   house work, can take 1 flight of stairs and cutting the grass with a mower----- He can undertake all the above activities without  chest pain,chest tightness, Shortness of breath ,dizziness,lightheadedness making his exercise tolerance more  than 4 Mets.       Review of Systems   Constitutional: Negative for chills and fever.          Weight gain from reduced activity   HENT:        STOPBANG score  3/ 8     Snoring   Neck size over 40 CM  Male gender    Snoring less since cut back on alcohol    Eyes:        No unusual vision changes   Respiratory:        No cough , phlegm  No Hemoptysis   Cardiovascular:        As noted   Gastrointestinal:        Bowels- Regular  No overt GI/ blood losses   Endocrine:        Prednisone use > 20 mg daily for 3 weeks- none   Genitourinary: Negative for dysuria.        No urinary hesitancy    Musculoskeletal:        As above      Skin: Negative for rash.   Neurological: Negative for syncope.        No unilateral weakness   Hematological:        Current use of Anticoagulants  Antiplatelet agents  None   Psychiatric/Behavioral:        No Depression,Anxiety     no vascular stenting             No anesthesia, bleeding, cardiac problems, PONV with previous surgeries/procedures.  Medications and Allergies reviewed in epic.   FH- No anesthesia,bleeding / venous thrombosis , early onset heart disease in family     Physical Exam  Blood pressure (!) 156/94, pulse 71, temperature 97.5 °F (36.4 °C), temperature source Oral, height 5' 8.5" (1.74 m), weight 80.9 kg (178 lb 4.8 oz), SpO2 98 %.      Physical Exam  Constitutional- Vitals - Body mass index is 26.72 kg/m².,   Vitals:    07/25/19 1006   BP: (!) " 156/94   Pulse: 71   Temp: 97.5 °F (36.4 °C)     General appearance-Conscious,Coherent  Eyes- No conjunctival icterus,pupils  round  and reactive to light   ENT-Oral cavity- moist  , Hearing grossly normal   Neck- No thyromegaly ,Trachea -central, No jugular venous distension,   No Carotid Bruit   Cardiovascular -Heart Sounds- Normal  and  no murmur   , No gallop rhythm   Respiratory - Normal Respiratory Effort, Normal breath sounds,  no wheeze  and  no forced expiratory wheeze    Peripheral pitting pedal edema-- none , no calf pain   Gastrointestinal -Soft abdomen, No palpable masses, Non Tender,Liver,Spleen not palpable. No-- free fluid and shifting dullness  Musculoskeletal- No finger Clubbing. Strength grossly normal   Lymphatic-No Palpable cervical, axillary,Inguinal lymphadenopathy   Psychiatric - normal effect,Orientation  Rt Dorsalis pedis pulses-palpable    Lt Dorsalis pedis pulses- palpable   Rt Posterior tibial pulses -palpable   Left posterior tibial pulses -palpable   Miscellaneous - no gynecomastia . no spider angioma ,  no asterixis,  no dupuytren's contracture and  no renal bruit  Investigations  Lab and Imaging have been reviewed in epic.    Jan 2019     No evidence of deep venous thrombosis in the left lower extremity      Review of old records- Was done and information gathered regards to events leading to surgery and health conditions of significance in the perioperative period.    Outpatient Subjective & Objective

## 2019-07-25 NOTE — ASSESSMENT & PLAN NOTE
Cut back   Tobacco use- Smokes Cigarettes- quarter  PPD- I suggested to consider stopping  smoking tobacco for its benefits in the carole operative period and in the long term  Cut back from a  pack a day to quarter pack   in a day     Tobacco use-I  Informed about risk of wound healing problem ,infection,lung complications,thrombosis with tobacco use     Not known to have COPD, Bronchitis, Emphysema,, chronic phlegm   No inhaler, oxygen use     No SOB

## 2019-07-25 NOTE — ASSESSMENT & PLAN NOTE
With spicy foods  Once a month   Started about 2015   Painless     EGD - 7/1/2019 - Normal esophagus. Esophagogastric landmarks identified. Normal gastric body and antrum. Normal examined duodenum.       colonoscopy - 7/1/2019 - -                         - Internal hemorrhoids.     Suggested watching for bleeding , due to thromboem,olic p[prophylaxis that he will be getting post op

## 2019-07-31 ENCOUNTER — TELEPHONE (OUTPATIENT)
Dept: ORTHOPEDICS | Facility: CLINIC | Age: 44
End: 2019-07-31

## 2019-07-31 NOTE — PROGRESS NOTES
Spoke with pt informed him to arrive at 5am 2nd floor Sx center for Sx on 08/02. No food and cloudy liquids after midnight. Pt gave verbal understanding.

## 2019-08-02 ENCOUNTER — HOSPITAL ENCOUNTER (INPATIENT)
Facility: HOSPITAL | Age: 44
LOS: 1 days | Discharge: HOME HEALTH | DRG: 470 | End: 2019-08-03
Attending: ORTHOPAEDIC SURGERY | Admitting: ORTHOPAEDIC SURGERY
Payer: COMMERCIAL

## 2019-08-02 ENCOUNTER — ANESTHESIA (OUTPATIENT)
Dept: SURGERY | Facility: HOSPITAL | Age: 44
DRG: 470 | End: 2019-08-02
Payer: COMMERCIAL

## 2019-08-02 DIAGNOSIS — M87.052 AVASCULAR NECROSIS OF BONE OF LEFT HIP: ICD-10-CM

## 2019-08-02 DIAGNOSIS — Z96.642 S/P HIP REPLACEMENT, LEFT: Primary | ICD-10-CM

## 2019-08-02 PROCEDURE — 88304 TISSUE EXAM BY PATHOLOGIST: CPT | Performed by: PATHOLOGY

## 2019-08-02 PROCEDURE — 97530 THERAPEUTIC ACTIVITIES: CPT

## 2019-08-02 PROCEDURE — 88311 TISSUE SPECIMEN TO PATHOLOGY - SURGERY: ICD-10-PCS | Mod: 26,,, | Performed by: PATHOLOGY

## 2019-08-02 PROCEDURE — D9220A PRA ANESTHESIA: Mod: ANES,,, | Performed by: ANESTHESIOLOGY

## 2019-08-02 PROCEDURE — 27130 PR TOTAL HIP ARTHROPLASTY: ICD-10-PCS | Mod: LT,,, | Performed by: ORTHOPAEDIC SURGERY

## 2019-08-02 PROCEDURE — 25000003 PHARM REV CODE 250: Performed by: STUDENT IN AN ORGANIZED HEALTH CARE EDUCATION/TRAINING PROGRAM

## 2019-08-02 PROCEDURE — 97116 GAIT TRAINING THERAPY: CPT

## 2019-08-02 PROCEDURE — 63600175 PHARM REV CODE 636 W HCPCS: Performed by: ANESTHESIOLOGY

## 2019-08-02 PROCEDURE — 27800517 HC TRAY,EPIDURAL-CONTINUOUS: Performed by: ANESTHESIOLOGY

## 2019-08-02 PROCEDURE — 88311 DECALCIFY TISSUE: CPT | Mod: 26,,, | Performed by: PATHOLOGY

## 2019-08-02 PROCEDURE — 25000003 PHARM REV CODE 250: Performed by: PHYSICIAN ASSISTANT

## 2019-08-02 PROCEDURE — 27201423 OPTIME MED/SURG SUP & DEVICES STERILE SUPPLY: Performed by: ORTHOPAEDIC SURGERY

## 2019-08-02 PROCEDURE — 27130 TOTAL HIP ARTHROPLASTY: CPT | Mod: LT,,, | Performed by: ORTHOPAEDIC SURGERY

## 2019-08-02 PROCEDURE — S0077 INJECTION, CLINDAMYCIN PHOSP: HCPCS | Performed by: PHYSICIAN ASSISTANT

## 2019-08-02 PROCEDURE — 12000002 HC ACUTE/MED SURGE SEMI-PRIVATE ROOM

## 2019-08-02 PROCEDURE — 94761 N-INVAS EAR/PLS OXIMETRY MLT: CPT

## 2019-08-02 PROCEDURE — 94799 UNLISTED PULMONARY SVC/PX: CPT

## 2019-08-02 PROCEDURE — 63600175 PHARM REV CODE 636 W HCPCS: Performed by: NURSE ANESTHETIST, CERTIFIED REGISTERED

## 2019-08-02 PROCEDURE — 36000711: Performed by: ORTHOPAEDIC SURGERY

## 2019-08-02 PROCEDURE — C1713 ANCHOR/SCREW BN/BN,TIS/BN: HCPCS | Performed by: ORTHOPAEDIC SURGERY

## 2019-08-02 PROCEDURE — 25000003 PHARM REV CODE 250

## 2019-08-02 PROCEDURE — 37000009 HC ANESTHESIA EA ADD 15 MINS: Performed by: ORTHOPAEDIC SURGERY

## 2019-08-02 PROCEDURE — 25000003 PHARM REV CODE 250: Performed by: NURSE ANESTHETIST, CERTIFIED REGISTERED

## 2019-08-02 PROCEDURE — D9220A PRA ANESTHESIA: ICD-10-PCS | Mod: ANES,,, | Performed by: ANESTHESIOLOGY

## 2019-08-02 PROCEDURE — 88304 TISSUE SPECIMEN TO PATHOLOGY - SURGERY: ICD-10-PCS | Mod: 26,,, | Performed by: PATHOLOGY

## 2019-08-02 PROCEDURE — 37000008 HC ANESTHESIA 1ST 15 MINUTES: Performed by: ORTHOPAEDIC SURGERY

## 2019-08-02 PROCEDURE — 71000039 HC RECOVERY, EACH ADD'L HOUR: Performed by: ORTHOPAEDIC SURGERY

## 2019-08-02 PROCEDURE — 36000710: Performed by: ORTHOPAEDIC SURGERY

## 2019-08-02 PROCEDURE — S4991 NICOTINE PATCH NONLEGEND: HCPCS | Performed by: STUDENT IN AN ORGANIZED HEALTH CARE EDUCATION/TRAINING PROGRAM

## 2019-08-02 PROCEDURE — S0028 INJECTION, FAMOTIDINE, 20 MG: HCPCS | Performed by: ANESTHESIOLOGY

## 2019-08-02 PROCEDURE — 63600175 PHARM REV CODE 636 W HCPCS: Performed by: PHYSICIAN ASSISTANT

## 2019-08-02 PROCEDURE — 25000003 PHARM REV CODE 250: Performed by: ANESTHESIOLOGY

## 2019-08-02 PROCEDURE — 97161 PT EVAL LOW COMPLEX 20 MIN: CPT

## 2019-08-02 PROCEDURE — 27000221 HC OXYGEN, UP TO 24 HOURS

## 2019-08-02 PROCEDURE — 71000033 HC RECOVERY, INTIAL HOUR: Performed by: ORTHOPAEDIC SURGERY

## 2019-08-02 PROCEDURE — D9220A PRA ANESTHESIA: ICD-10-PCS | Mod: CRNA,,, | Performed by: NURSE ANESTHETIST, CERTIFIED REGISTERED

## 2019-08-02 PROCEDURE — C1776 JOINT DEVICE (IMPLANTABLE): HCPCS | Performed by: ORTHOPAEDIC SURGERY

## 2019-08-02 PROCEDURE — D9220A PRA ANESTHESIA: Mod: CRNA,,, | Performed by: NURSE ANESTHETIST, CERTIFIED REGISTERED

## 2019-08-02 PROCEDURE — 63600175 PHARM REV CODE 636 W HCPCS: Performed by: ORTHOPAEDIC SURGERY

## 2019-08-02 PROCEDURE — 97165 OT EVAL LOW COMPLEX 30 MIN: CPT

## 2019-08-02 DEVICE — SCREW BONE ACT CANCELLO 6.5X40: Type: IMPLANTABLE DEVICE | Site: HIP | Status: FUNCTIONAL

## 2019-08-02 DEVICE — CUP 54MM: Type: IMPLANTABLE DEVICE | Site: HIP | Status: FUNCTIONAL

## 2019-08-02 DEVICE — HEAD FEM 12/14 TAPER 1.5X36: Type: IMPLANTABLE DEVICE | Site: HIP | Status: FUNCTIONAL

## 2019-08-02 DEVICE — SCREW PINNACLE 6.5 X 20: Type: IMPLANTABLE DEVICE | Site: HIP | Status: FUNCTIONAL

## 2019-08-02 DEVICE — LINE ACET PINN 36X54 ALTRX: Type: IMPLANTABLE DEVICE | Site: HIP | Status: FUNCTIONAL

## 2019-08-02 DEVICE — STEM ACTIS DUOFIX STD SZ 6: Type: IMPLANTABLE DEVICE | Site: HIP | Status: FUNCTIONAL

## 2019-08-02 RX ORDER — MORPHINE SULFATE 2 MG/ML
2 INJECTION, SOLUTION INTRAMUSCULAR; INTRAVENOUS
Status: DISCONTINUED | OUTPATIENT
Start: 2019-08-02 | End: 2019-08-03 | Stop reason: HOSPADM

## 2019-08-02 RX ORDER — KETOROLAC TROMETHAMINE 30 MG/ML
INJECTION, SOLUTION INTRAMUSCULAR; INTRAVENOUS
Status: DISCONTINUED | OUTPATIENT
Start: 2019-08-02 | End: 2019-08-02 | Stop reason: HOSPADM

## 2019-08-02 RX ORDER — TAMSULOSIN HYDROCHLORIDE 0.4 MG/1
0.4 CAPSULE ORAL DAILY
Status: DISCONTINUED | OUTPATIENT
Start: 2019-08-02 | End: 2019-08-02

## 2019-08-02 RX ORDER — FENTANYL CITRATE 50 UG/ML
INJECTION, SOLUTION INTRAMUSCULAR; INTRAVENOUS
Status: DISCONTINUED | OUTPATIENT
Start: 2019-08-02 | End: 2019-08-02

## 2019-08-02 RX ORDER — OXYCODONE HYDROCHLORIDE 5 MG/1
5 TABLET ORAL
Status: DISCONTINUED | OUTPATIENT
Start: 2019-08-02 | End: 2019-08-03 | Stop reason: HOSPADM

## 2019-08-02 RX ORDER — OXYCODONE HYDROCHLORIDE 5 MG/1
TABLET ORAL
Status: COMPLETED
Start: 2019-08-02 | End: 2019-08-02

## 2019-08-02 RX ORDER — SODIUM CHLORIDE 9 MG/ML
INJECTION, SOLUTION INTRAVENOUS
Status: COMPLETED | OUTPATIENT
Start: 2019-08-02 | End: 2019-08-02

## 2019-08-02 RX ORDER — GLYCOPYRROLATE 0.2 MG/ML
INJECTION INTRAMUSCULAR; INTRAVENOUS
Status: DISCONTINUED | OUTPATIENT
Start: 2019-08-02 | End: 2019-08-02

## 2019-08-02 RX ORDER — IBUPROFEN 200 MG
1 TABLET ORAL DAILY
Status: DISCONTINUED | OUTPATIENT
Start: 2019-08-02 | End: 2019-08-03 | Stop reason: HOSPADM

## 2019-08-02 RX ORDER — MUPIROCIN 20 MG/G
1 OINTMENT TOPICAL 2 TIMES DAILY
Status: DISCONTINUED | OUTPATIENT
Start: 2019-08-02 | End: 2019-08-03 | Stop reason: HOSPADM

## 2019-08-02 RX ORDER — OXYCODONE HYDROCHLORIDE 10 MG/1
10 TABLET ORAL
Status: DISCONTINUED | OUTPATIENT
Start: 2019-08-02 | End: 2019-08-03 | Stop reason: HOSPADM

## 2019-08-02 RX ORDER — POLYETHYLENE GLYCOL 3350 17 G/17G
17 POWDER, FOR SOLUTION ORAL DAILY
Status: DISCONTINUED | OUTPATIENT
Start: 2019-08-02 | End: 2019-08-03 | Stop reason: HOSPADM

## 2019-08-02 RX ORDER — PROPOFOL 10 MG/ML
VIAL (ML) INTRAVENOUS
Status: DISCONTINUED | OUTPATIENT
Start: 2019-08-02 | End: 2019-08-02

## 2019-08-02 RX ORDER — ASPIRIN 81 MG/1
81 TABLET ORAL 2 TIMES DAILY
Status: DISCONTINUED | OUTPATIENT
Start: 2019-08-02 | End: 2019-08-03 | Stop reason: HOSPADM

## 2019-08-02 RX ORDER — MIDAZOLAM HYDROCHLORIDE 1 MG/ML
INJECTION, SOLUTION INTRAMUSCULAR; INTRAVENOUS
Status: DISCONTINUED | OUTPATIENT
Start: 2019-08-02 | End: 2019-08-02

## 2019-08-02 RX ORDER — PREGABALIN 150 MG/1
150 CAPSULE ORAL NIGHTLY
Status: DISCONTINUED | OUTPATIENT
Start: 2019-08-02 | End: 2019-08-03 | Stop reason: HOSPADM

## 2019-08-02 RX ORDER — CELECOXIB 200 MG/1
200 CAPSULE ORAL DAILY
Qty: 30 CAPSULE | Refills: 0 | Status: SHIPPED | OUTPATIENT
Start: 2019-08-02 | End: 2019-09-03

## 2019-08-02 RX ORDER — ASPIRIN 81 MG/1
81 TABLET ORAL 2 TIMES DAILY
Qty: 60 TABLET | Refills: 0 | Status: SHIPPED | OUTPATIENT
Start: 2019-08-02 | End: 2021-03-09 | Stop reason: SDUPTHER

## 2019-08-02 RX ORDER — LIDOCAINE HYDROCHLORIDE 10 MG/ML
1 INJECTION, SOLUTION EPIDURAL; INFILTRATION; INTRACAUDAL; PERINEURAL
Status: DISCONTINUED | OUTPATIENT
Start: 2019-08-02 | End: 2019-08-02 | Stop reason: HOSPADM

## 2019-08-02 RX ORDER — CELECOXIB 200 MG/1
400 CAPSULE ORAL
Status: COMPLETED | OUTPATIENT
Start: 2019-08-02 | End: 2019-08-02

## 2019-08-02 RX ORDER — DEXAMETHASONE SODIUM PHOSPHATE 4 MG/ML
INJECTION, SOLUTION INTRA-ARTICULAR; INTRALESIONAL; INTRAMUSCULAR; INTRAVENOUS; SOFT TISSUE
Status: DISCONTINUED | OUTPATIENT
Start: 2019-08-02 | End: 2019-08-02

## 2019-08-02 RX ORDER — AMOXICILLIN 250 MG
1 CAPSULE ORAL 2 TIMES DAILY
Status: DISCONTINUED | OUTPATIENT
Start: 2019-08-02 | End: 2019-08-03 | Stop reason: HOSPADM

## 2019-08-02 RX ORDER — PROPOFOL 10 MG/ML
VIAL (ML) INTRAVENOUS CONTINUOUS PRN
Status: DISCONTINUED | OUTPATIENT
Start: 2019-08-02 | End: 2019-08-02

## 2019-08-02 RX ORDER — CLINDAMYCIN PHOSPHATE 900 MG/50ML
900 INJECTION, SOLUTION INTRAVENOUS
Status: COMPLETED | OUTPATIENT
Start: 2019-08-02 | End: 2019-08-02

## 2019-08-02 RX ORDER — DOCUSATE SODIUM 100 MG/1
100 CAPSULE, LIQUID FILLED ORAL 2 TIMES DAILY
Qty: 60 CAPSULE | Refills: 0 | Status: SHIPPED | OUTPATIENT
Start: 2019-08-02 | End: 2019-09-02

## 2019-08-02 RX ORDER — TRANEXAMIC ACID 100 MG/ML
INJECTION, SOLUTION INTRAVENOUS
Status: DISCONTINUED | OUTPATIENT
Start: 2019-08-02 | End: 2019-08-02

## 2019-08-02 RX ORDER — NALOXONE HCL 0.4 MG/ML
0.02 VIAL (ML) INJECTION
Status: DISCONTINUED | OUTPATIENT
Start: 2019-08-02 | End: 2019-08-03 | Stop reason: HOSPADM

## 2019-08-02 RX ORDER — OXYCODONE HYDROCHLORIDE 5 MG/1
5 TABLET ORAL EVERY 6 HOURS PRN
Qty: 30 TABLET | Refills: 0 | Status: SHIPPED | OUTPATIENT
Start: 2019-08-02 | End: 2019-08-08 | Stop reason: SDUPTHER

## 2019-08-02 RX ORDER — ACETAMINOPHEN 500 MG
1000 TABLET ORAL ONCE
Status: COMPLETED | OUTPATIENT
Start: 2019-08-02 | End: 2019-08-02

## 2019-08-02 RX ORDER — KETAMINE HCL IN 0.9 % NACL 50 MG/5 ML
SYRINGE (ML) INTRAVENOUS
Status: DISCONTINUED | OUTPATIENT
Start: 2019-08-02 | End: 2019-08-02

## 2019-08-02 RX ORDER — FAMOTIDINE 20 MG/1
20 TABLET, FILM COATED ORAL 2 TIMES DAILY
Status: DISCONTINUED | OUTPATIENT
Start: 2019-08-02 | End: 2019-08-03 | Stop reason: HOSPADM

## 2019-08-02 RX ORDER — LABETALOL HYDROCHLORIDE 5 MG/ML
INJECTION, SOLUTION INTRAVENOUS
Status: DISCONTINUED | OUTPATIENT
Start: 2019-08-02 | End: 2019-08-02

## 2019-08-02 RX ORDER — VANCOMYCIN HYDROCHLORIDE 1 G/20ML
INJECTION, POWDER, LYOPHILIZED, FOR SOLUTION INTRAVENOUS
Status: DISCONTINUED | OUTPATIENT
Start: 2019-08-02 | End: 2019-08-02 | Stop reason: HOSPADM

## 2019-08-02 RX ORDER — LIDOCAINE HCL/PF 100 MG/5ML
SYRINGE (ML) INTRAVENOUS
Status: DISCONTINUED | OUTPATIENT
Start: 2019-08-02 | End: 2019-08-02

## 2019-08-02 RX ORDER — ACETAMINOPHEN 500 MG
1000 TABLET ORAL EVERY 6 HOURS
Status: DISCONTINUED | OUTPATIENT
Start: 2019-08-02 | End: 2019-08-03 | Stop reason: HOSPADM

## 2019-08-02 RX ORDER — BISACODYL 10 MG
10 SUPPOSITORY, RECTAL RECTAL EVERY 12 HOURS PRN
Status: DISCONTINUED | OUTPATIENT
Start: 2019-08-02 | End: 2019-08-03 | Stop reason: HOSPADM

## 2019-08-02 RX ORDER — MORPHINE SULFATE 4 MG/ML
INJECTION, SOLUTION INTRAMUSCULAR; INTRAVENOUS
Status: DISCONTINUED | OUTPATIENT
Start: 2019-08-02 | End: 2019-08-02 | Stop reason: HOSPADM

## 2019-08-02 RX ORDER — MUPIROCIN 20 MG/G
1 OINTMENT TOPICAL
Status: COMPLETED | OUTPATIENT
Start: 2019-08-02 | End: 2019-08-02

## 2019-08-02 RX ORDER — DEXTROMETHORPHAN HYDROBROMIDE, GUAIFENESIN 5; 100 MG/5ML; MG/5ML
650 LIQUID ORAL EVERY 6 HOURS PRN
Qty: 120 TABLET | Refills: 0 | OUTPATIENT
Start: 2019-08-02 | End: 2021-03-09

## 2019-08-02 RX ORDER — PREGABALIN 75 MG/1
75 CAPSULE ORAL
Status: COMPLETED | OUTPATIENT
Start: 2019-08-02 | End: 2019-08-02

## 2019-08-02 RX ORDER — RAMELTEON 8 MG/1
8 TABLET ORAL NIGHTLY PRN
Status: DISCONTINUED | OUTPATIENT
Start: 2019-08-02 | End: 2019-08-03 | Stop reason: HOSPADM

## 2019-08-02 RX ORDER — CLINDAMYCIN PHOSPHATE 900 MG/50ML
900 INJECTION, SOLUTION INTRAVENOUS
Status: COMPLETED | OUTPATIENT
Start: 2019-08-02 | End: 2019-08-03

## 2019-08-02 RX ORDER — SODIUM CHLORIDE 9 MG/ML
INJECTION, SOLUTION INTRAVENOUS CONTINUOUS
Status: ACTIVE | OUTPATIENT
Start: 2019-08-02 | End: 2019-08-03

## 2019-08-02 RX ORDER — METHYLPREDNISOLONE SODIUM SUCCINATE 40 MG/ML
INJECTION INTRAMUSCULAR; INTRAVENOUS
Status: DISCONTINUED | OUTPATIENT
Start: 2019-08-02 | End: 2019-08-02 | Stop reason: HOSPADM

## 2019-08-02 RX ORDER — ROPIVACAINE HYDROCHLORIDE 5 MG/ML
INJECTION, SOLUTION EPIDURAL; INFILTRATION; PERINEURAL
Status: DISCONTINUED | OUTPATIENT
Start: 2019-08-02 | End: 2019-08-02 | Stop reason: HOSPADM

## 2019-08-02 RX ORDER — CELECOXIB 200 MG/1
200 CAPSULE ORAL DAILY
Status: DISCONTINUED | OUTPATIENT
Start: 2019-08-03 | End: 2019-08-03 | Stop reason: HOSPADM

## 2019-08-02 RX ORDER — SODIUM CHLORIDE 9 MG/ML
INJECTION, SOLUTION INTRAVENOUS CONTINUOUS PRN
Status: DISCONTINUED | OUTPATIENT
Start: 2019-08-02 | End: 2019-08-02

## 2019-08-02 RX ORDER — FAMOTIDINE 10 MG/ML
20 INJECTION INTRAVENOUS 2 TIMES DAILY
Status: DISCONTINUED | OUTPATIENT
Start: 2019-08-02 | End: 2019-08-02 | Stop reason: HOSPADM

## 2019-08-02 RX ORDER — ONDANSETRON 2 MG/ML
INJECTION INTRAMUSCULAR; INTRAVENOUS
Status: DISCONTINUED | OUTPATIENT
Start: 2019-08-02 | End: 2019-08-02

## 2019-08-02 RX ORDER — ONDANSETRON 2 MG/ML
4 INJECTION INTRAMUSCULAR; INTRAVENOUS EVERY 8 HOURS PRN
Status: DISCONTINUED | OUTPATIENT
Start: 2019-08-02 | End: 2019-08-03 | Stop reason: HOSPADM

## 2019-08-02 RX ORDER — ACETAMINOPHEN 10 MG/ML
1000 INJECTION, SOLUTION INTRAVENOUS ONCE
Status: COMPLETED | OUTPATIENT
Start: 2019-08-02 | End: 2019-08-02

## 2019-08-02 RX ADMIN — ACETAMINOPHEN 1000 MG: 500 TABLET ORAL at 06:08

## 2019-08-02 RX ADMIN — PROPOFOL 200 MCG/KG/MIN: 10 INJECTION, EMULSION INTRAVENOUS at 07:08

## 2019-08-02 RX ADMIN — ASPIRIN 81 MG: 81 TABLET, COATED ORAL at 09:08

## 2019-08-02 RX ADMIN — CELECOXIB 400 MG: 200 CAPSULE ORAL at 05:08

## 2019-08-02 RX ADMIN — OXYCODONE HYDROCHLORIDE 10 MG: 10 TABLET ORAL at 02:08

## 2019-08-02 RX ADMIN — OXYCODONE HYDROCHLORIDE 10 MG: 10 TABLET ORAL at 10:08

## 2019-08-02 RX ADMIN — MIDAZOLAM HYDROCHLORIDE 2 MG: 1 INJECTION, SOLUTION INTRAMUSCULAR; INTRAVENOUS at 07:08

## 2019-08-02 RX ADMIN — NICOTINE 1 PATCH: 21 PATCH, EXTENDED RELEASE TRANSDERMAL at 12:08

## 2019-08-02 RX ADMIN — CLINDAMYCIN IN 5 PERCENT DEXTROSE 900 MG: 18 INJECTION, SOLUTION INTRAVENOUS at 04:08

## 2019-08-02 RX ADMIN — SODIUM CHLORIDE: 0.9 INJECTION, SOLUTION INTRAVENOUS at 09:08

## 2019-08-02 RX ADMIN — CLINDAMYCIN IN 5 PERCENT DEXTROSE 900 MG: 18 INJECTION, SOLUTION INTRAVENOUS at 11:08

## 2019-08-02 RX ADMIN — DEXAMETHASONE SODIUM PHOSPHATE 8 MG: 4 INJECTION, SOLUTION INTRAMUSCULAR; INTRAVENOUS at 07:08

## 2019-08-02 RX ADMIN — TAMSULOSIN HYDROCHLORIDE 0.4 MG: 0.4 CAPSULE ORAL at 09:08

## 2019-08-02 RX ADMIN — PREGABALIN 150 MG: 150 CAPSULE ORAL at 09:08

## 2019-08-02 RX ADMIN — MUPIROCIN 1 G: 20 OINTMENT TOPICAL at 06:08

## 2019-08-02 RX ADMIN — PREGABALIN 75 MG: 75 CAPSULE ORAL at 05:08

## 2019-08-02 RX ADMIN — PROPOFOL 100 MG: 10 INJECTION, EMULSION INTRAVENOUS at 07:08

## 2019-08-02 RX ADMIN — Medication 30 MG: at 07:08

## 2019-08-02 RX ADMIN — SODIUM CHLORIDE: 0.9 INJECTION, SOLUTION INTRAVENOUS at 04:08

## 2019-08-02 RX ADMIN — LIDOCAINE HYDROCHLORIDE 50 MG: 20 INJECTION, SOLUTION INTRAVENOUS at 07:08

## 2019-08-02 RX ADMIN — CLINDAMYCIN IN 5 PERCENT DEXTROSE 900 MG: 18 INJECTION, SOLUTION INTRAVENOUS at 07:08

## 2019-08-02 RX ADMIN — VANCOMYCIN HYDROCHLORIDE 1 G: 1 INJECTION, POWDER, LYOPHILIZED, FOR SOLUTION INTRAVENOUS at 07:08

## 2019-08-02 RX ADMIN — ACETAMINOPHEN 1000 MG: 10 INJECTION, SOLUTION INTRAVENOUS at 11:08

## 2019-08-02 RX ADMIN — TRANEXAMIC ACID 1000 MG: 100 INJECTION, SOLUTION INTRAVENOUS at 07:08

## 2019-08-02 RX ADMIN — FENTANYL CITRATE 50 MCG: 50 INJECTION, SOLUTION INTRAMUSCULAR; INTRAVENOUS at 07:08

## 2019-08-02 RX ADMIN — SENNOSIDES,DOCUSATE SODIUM 1 TABLET: 8.6; 5 TABLET, FILM COATED ORAL at 09:08

## 2019-08-02 RX ADMIN — LABETALOL HYDROCHLORIDE 5 MG: 5 INJECTION, SOLUTION INTRAVENOUS at 07:08

## 2019-08-02 RX ADMIN — SODIUM CHLORIDE, SODIUM GLUCONATE, SODIUM ACETATE, POTASSIUM CHLORIDE, MAGNESIUM CHLORIDE, SODIUM PHOSPHATE, DIBASIC, AND POTASSIUM PHOSPHATE: .53; .5; .37; .037; .03; .012; .00082 INJECTION, SOLUTION INTRAVENOUS at 08:08

## 2019-08-02 RX ADMIN — NICOTINE 1 PATCH: 21 PATCH, EXTENDED RELEASE TRANSDERMAL at 06:08

## 2019-08-02 RX ADMIN — Medication 10 MG: at 08:08

## 2019-08-02 RX ADMIN — SODIUM CHLORIDE: 0.9 INJECTION, SOLUTION INTRAVENOUS at 06:08

## 2019-08-02 RX ADMIN — MEPIVACAINE HYDROCHLORIDE 3 ML: 15 INJECTION, SOLUTION EPIDURAL; INFILTRATION at 07:08

## 2019-08-02 RX ADMIN — LABETALOL HYDROCHLORIDE 10 MG: 5 INJECTION, SOLUTION INTRAVENOUS at 07:08

## 2019-08-02 RX ADMIN — OXYCODONE HYDROCHLORIDE 10 MG: 10 TABLET ORAL at 07:08

## 2019-08-02 RX ADMIN — ONDANSETRON 4 MG: 2 INJECTION INTRAMUSCULAR; INTRAVENOUS at 07:08

## 2019-08-02 RX ADMIN — ACETAMINOPHEN 1000 MG: 500 TABLET ORAL at 11:08

## 2019-08-02 RX ADMIN — FAMOTIDINE 20 MG: 10 INJECTION, SOLUTION INTRAVENOUS at 06:08

## 2019-08-02 RX ADMIN — TRANEXAMIC ACID 1000 MG: 100 INJECTION, SOLUTION INTRAVENOUS at 08:08

## 2019-08-02 RX ADMIN — FAMOTIDINE 20 MG: 20 TABLET, FILM COATED ORAL at 09:08

## 2019-08-02 RX ADMIN — MUPIROCIN 1 G: 20 OINTMENT TOPICAL at 09:08

## 2019-08-02 RX ADMIN — OXYCODONE HYDROCHLORIDE 15 MG: 5 TABLET ORAL at 09:08

## 2019-08-02 RX ADMIN — GLYCOPYRROLATE 0.2 MG: 0.2 INJECTION, SOLUTION INTRAMUSCULAR; INTRAVENOUS at 07:08

## 2019-08-02 NOTE — PLAN OF CARE
08/02/19 1319   Post-Acute Status   Post-Acute Authorization Placement   Post-Acute Placement Status Referrals Sent     Patient is in need of home health post discharge and is requesting [facility]. SW sent the referral via [facility] and will follow up.  \  Katt Olivia LMSW  [facility]   [id number]

## 2019-08-02 NOTE — ANESTHESIA PROCEDURE NOTES
CSE    Patient location during procedure: OR  Start time: 8/2/2019 7:14 AM  Timeout: 8/2/2019 7:11 AM  End time: 8/2/2019 7:20 AM    Staffing  Authorizing Provider: Petros Will MD  Performing Provider: Petros Will MD    Preanesthetic Checklist  Completed: patient identified, site marked, surgical consent, pre-op evaluation, timeout performed, IV checked, risks and benefits discussed and monitors and equipment checked  CSE  Patient position: sitting  Prep: ChloraPrep  Patient monitoring: heart rate, continuous pulse ox and frequent blood pressure checks  Approach: midline  Spinal Needle  Needle type: Marcell   Needle gauge: 25 G  Needle length: 5 in  Epidural Needle  Injection technique: CASSY air  Needle type: Tuohy   Needle gauge: 17 G  Needle length: 3.5 in  Needle insertion depth: 6.5 cm  Location: L4-5  Needle localization: anatomical landmarks  Catheter  Catheter type: springwVTM  Catheter size: 19 G  Catheter at skin depth: 10 cm  Additional Documentation: negative aspiration for CSF and negative aspiration for heme  Assessment  Sensory level: T8   Dermatomal levels determined by pinch or prick  Intrathecal Medications:  administered: primary anesthetic mcg of    Additional Notes  VSS  No complications

## 2019-08-02 NOTE — PT/OT/SLP EVAL
"Physical Therapy Evaluation and Treatment    Patient Name:  Raoul Logan   MRN:  [id number]    Recommendations:     Discharge Recommendations:  home health PT   Discharge Equipment Recommendations: walker, rolling   Barriers to discharge: None    Assessment:     Raoul Logan is a 44 y.o. male admitted with a medical diagnosis of S/P hip replacement, left.  He presents with the following impairments/functional limitations:  weakness, impaired functional mobilty, gait instability, impaired balance, decreased lower extremity function, pain, impaired skin, orthopedic precautions. Patient tolerated PT evaluation well today. He ambulated 3 steps forward, backward, and side stepped to HOB with SW and CGA. He would continue to benefit from PT in order to get back to PLOF.    Rehab Prognosis: Good; patient would benefit from acute skilled PT services to address these deficits and reach maximum level of function.    Recent Surgery: Procedure(s) (LRB):  ARTHROPLASTY, HIP, ANTERIOR APPROACH- DEPUY-ACTIS+PINNACLE (Left) Day of Surgery    Plan:     During this hospitalization, patient to be seen daily to address the identified rehab impairments via gait training, therapeutic activities, therapeutic exercises and progress toward the following goals:    · Plan of Care Expires:  08/09/19    Subjective     Chief Complaint: Patient agreeable to participate in PT evaluation.   Patient/Family Comments/goals: To walk without pain.   Pain/Comfort:  · Pain Rating 1: 2/10  · Location - Side 1: Left  · Location 1: hip  · Pain Addressed 1: Distraction, Cessation of Activity    Living Environment:  Patient lives with his girlfriend and mother in a [facility] with ramp entry and 5" threshold into the door. Prior to admission, patients level of function was independent.  Equipment at home but was not using: single point cane (borrowing a BSC. girlfriend is picking up a shower chair. ). Upon discharge, patient will have assistance from " girlfriend and mother.    Objective:     Communicated with RN prior to session.  Patient found supine with blood pressure cuff, cryotherapy, telemetry, pulse ox (continuous), FCD, peripheral IV  upon PT entry to room.    General Precautions: Standard, fall   Orthopedic Precautions:LLE weight bearing as tolerated, LLE anterior precautions   Braces: N/A     Exams:  · Cognitive Exam:  Patient is oriented to Person, Place, Time and Situation  · Sensation:    · -       Intact  · RLE ROM: WNL  · RLE Strength: WNL  · LLE ROM: WFL  · LLE Strength: appears WFL but unable to formally assess due to surgical pain    Functional Mobility:  · Bed Mobility:     · Supine to Sit: contact guard assistance  · Sit to Supine: contact guard assistance  · Transfers:     · Sit to Stand:  contact guard assistance with standard walker  · Gait: Patient ambulated 3 steps forward, backward, and side stepped to HOB with CGA and SW. No LOB or SOB noted.         Therapeutic Activities and Exercises:  Patient educated in:  -PT role and POC  -safety with transfers including hand placement  -gait sequencing and SW management  -OOB activity to maximize recovery   -anterior hip precautions      AM-PAC 6 CLICK MOBILITY  Total Score:17     Patient left supine with all lines intact and RN notified.    GOALS:   Multidisciplinary Problems     Physical Therapy Goals        Problem: Physical Therapy Goal    Goal Priority Disciplines Outcome Goal Variances Interventions   Physical Therapy Goal     PT, PT/OT      Description:  Goals to be met by: 19    Patient will increase functional independence with mobility by performin. Supine to sit with supervision  2. Sit to supine with supervision  3. Sit to stand transfer with Supervision  4. Gait x200 feet with Supervision using Rolling Walker  5. Ascend/Descend 1 curb step with Supervision using Rolling Walker  6. Lower extremity exercise program x30 reps per handout, with supervision                       History:     History reviewed. No pertinent past medical history.    Past Surgical History:   Procedure Laterality Date    COLONOSCOPY N/A 7/1/2019    Performed by [facility] (4TH FLR)    EGD (ESOPHAGOGASTRODUODENOSCOPY) N/A 7/1/2019    Performed by [facility]Wre [facility] at [facility] (4TH FLR)    KNEE SURGERY Left 1991       Time Tracking:     PT Received On: 08/02/19  PT Start Time: 1130     PT Stop Time: 1150  PT Total Time (min): 20 min     Billable Minutes: Evaluation 12 and Gait Training 8      Khushboo Mejía, PT  08/02/2019

## 2019-08-02 NOTE — PLAN OF CARE
[facility]    HOME HEALTH ORDERS  FACE TO FACE ENCOUNTER    Patient Name: Raoul Logan  YOB: 1975    PCP: José Miguel Carroll MD   PCP Address: [address] [place] [address]  PCP Phone Number: [phone]  PCP Fax: [phone]    Encounter Date: 08/02/2019    Admit to Home Health    Diagnoses:  Active Hospital Problems    Diagnosis  POA    *S/P hip replacement, left [Z96.642]  Not Applicable     Priority: 1 - High      Resolved Hospital Problems   No resolved problems to display.       Future Appointments   Date Time Provider Department Center   8/16/2019 10:30 AM Grant Rocha PA-C [facility] [place] [place]           I have seen and examined this patient face to face today. My clinical findings that support the need for the home health skilled services and home bound status are the following:  Weakness/numbness causing balance and gait disturbance due to Joint Replacement making it taxing to leave home.    Allergies:  Review of patient's allergies indicates:   Allergen Reactions    Pcn [penicillins] Anaphylaxis       Diet: regular diet    Activities: activity as tolerated    Home Health Admitting Clinician:   SN/PT to complete comprehensive assessment including routine vital signs. Instruct on disease process and s/s of complications to report to MD. Follow specific home health arthoplasty protocol. Review/verify medication list sent home with the patient at time of discharge  and instruct patient/caregiver as needed. If coumadin ordered, coumadin clinic to manage INR with INR draws 2x per week with a goal to maintain INR between 1.8 and 2.2. Frequency may be adjusted depending on start of care date.    Notify MD if SBP > 160 or < 90; DBP > 90 or < 50; HR > 120 or < 50; Temp > 101    Home Medical Equipment:  Walker, 3-1 bedside commode, transfer tub bench    CONSULTS:    Physical Therapy may admit if patient not on coumadin, PT to perform comprehensive assessment if  performing admit visit and generate therapy plan of care. Evaluate for home safety and equipment needs; Establish/upgrade home exercise program. Perform/instruct on therapeutic exercises, gait training, transfer training, and Range of Motion.      OTHER: (only select if patient needs other therapy disciplines)  Occupational Therapy to evaluate and treat. Evaluate home environment for safety and equipment needs. Perform/Instruct on transfers, ADL training, ROM, and therapeutic exercises.   to evaluate for community resources/long-range planning.  Aide to provide assistance with personal care, ADLs, and vital signs.    MISCELLANEOUS CARE:      WOUND CARE ORDERS:  Assess Surgical Incision/DSRG each TX  Aquacel AG drsg applied post-op leave on 14 days post op. Call MD if any drainage reaches border to border of drsg horizontally, s/s of infection, temp >101, induration, swelling or redness.  If dressing is removed per MD order, then apply island dressing, change/teach caregiver to perform daily dressing change if island dressing present.    Medications: Review discharge medications with patient and family and provide education.      Current Discharge Medication List      START taking these medications    Details   acetaminophen (TYLENOL) 650 MG TbSR Take 1 tablet (650 mg total) by mouth every 6 (six) hours as needed (pain).  Qty: 120 tablet, Refills: 0      aspirin (ECOTRIN) 81 MG EC tablet Take 1 tablet (81 mg total) by mouth 2 (two) times daily.  Qty: 60 tablet, Refills: 0      celecoxib (CELEBREX) 200 MG capsule Take 1 capsule (200 mg total) by mouth once daily.  Qty: 30 capsule, Refills: 0      docusate sodium (COLACE) 100 MG capsule Take 1 capsule (100 mg total) by mouth 2 (two) times daily.  Qty: 60 capsule, Refills: 0      oxyCODONE (ROXICODONE) 5 MG immediate release tablet Take 1 tablet (5 mg total) by mouth every 6 (six) hours as needed for Pain (1-2 as needed for pain).  Qty: 30 tablet, Refills:  0         STOP taking these medications       acetaminophen (TYLENOL ORAL) Comments:   Reason for Stopping:               I certify that this patient is confined to his home and needs intermittent skilled nursing care, physical therapy and occupational therapy.

## 2019-08-02 NOTE — INTERVAL H&P NOTE
The patient has been examined and the H&P has been reviewed:    I concur with the findings and no changes have occurred since H&P was written.    Anesthesia/Surgery risks, benefits and alternative options discussed and understood by patient/family.          Active Hospital Problems    Diagnosis  POA    S/P hip replacement, left [Z96.642]  Not Applicable      Resolved Hospital Problems   No resolved problems to display.

## 2019-08-02 NOTE — PLAN OF CARE
POD # 0 L DORITA. This CM met with patient and mother at bedside. Patient lives with mother and fiance who will provide transport at discharge. Care team recommend HH, dme rw, bsc, patient refused sc. Patient has hip kit at home.      [id number] - [place] - [address] AT [facility] [address]  [address]  [place] [address]  Phone: [phone] Fax: [phone]    Payor: BLUE CROSS BLUE SHIELD / Plan: [place]BS [place] PPO / Product Type: PPO /         08/02/19 1316   Discharge Assessment   Assessment Type Discharge Planning Assessment   Confirmed/corrected address and phone number on facesheet? Yes   Assessment information obtained from? Patient;Caregiver   Expected Length of Stay (days) 2   Communicated expected length of stay with patient/caregiver yes   Prior to hospitilization cognitive status: Alert/Oriented   Prior to hospitalization functional status: Independent   Current cognitive status: Alert/Oriented   Current Functional Status: Assistive Equipment   Lives With parent(s)   Able to Return to Prior Arrangements yes   Is patient able to care for self after discharge? Yes   Who are your caregiver(s) and their phone number(s)?   (mother Rosie [phone])   Patient's perception of discharge disposition home or selfcare   Readmission Within the Last 30 Days no previous admission in last 30 days   Patient currently being followed by outpatient case management? No   Patient currently receives any other outside agency services? No   Equipment Currently Used at Home walker, standard   Do you have any problems affording any of your prescribed medications? No   Is the patient taking medications as prescribed? yes   Does the patient have transportation home? Yes   Transportation Anticipated family or friend will provide   Does the patient receive services at the Coumadin Clinic? No   Discharge Plan A Home;Home Health   DME Needed Upon Discharge  walker, rolling;bedside  pedro pablo;shower chair   Patient/Family in Agreement with Plan yes     Whitney Vogel RN/BSN/CM  [facility]  [phone]  Ortho/[facility]

## 2019-08-02 NOTE — NURSING TRANSFER
Nursing Transfer Note      8/2/2019     Transfer To: 553[address]    Transfer via stretcher    Transfer with theron    Transported by pct    Medicines sent: no    Chart send with patient: Yes    Notified: family

## 2019-08-02 NOTE — PLAN OF CARE
Problem: Occupational Therapy Goal  Goal: Occupational Therapy Goal  Goals to be met by: 8/7/2019    Patient will increase functional independence with ADLs by performing:    UE Dressing with Supervision.  LE Dressing with Supervision with AD as needed.  Grooming while standing with Supervision.  Toileting from bedside commode with Supervision for hygiene and clothing management.   Stand pivot transfers with Supervision.  Toilet transfer to bedside commode with Supervision.       Outcome: Ongoing (interventions implemented as appropriate)  OT eval complete. Pt tolerated session well. Pt's functional outcomes have been established today based on his presenting functional level.       Comments: Cont OT POC

## 2019-08-02 NOTE — TRANSFER OF CARE
"Anesthesia Transfer of Care Note    Patient: Raoul Logan    Procedure(s) Performed: Procedure(s) (LRB):  ARTHROPLASTY, HIP, ANTERIOR APPROACH- DEPUY-ACTIS+PINNACLE (Left)    Patient location: PACU    Anesthesia Type: general and spinal    Transport from OR: Transported from OR on 6-10 L/min O2 by face mask with adequate spontaneous ventilation    Post pain: adequate analgesia    Post assessment: no apparent anesthetic complications and tolerated procedure well    Post vital signs: stable    Level of consciousness: sedated    Nausea/Vomiting: no nausea/vomiting    Complications: none    Transfer of care protocol was followed      Last vitals:   Visit Vitals  /86 (BP Location: Left arm, Patient Position: Lying)   Pulse 63   Temp 36.8 °C (98.3 °F) (Oral)   Resp 19   Ht 5' 8" (1.727 m)   Wt 81.6 kg (180 lb)   SpO2 100%   BMI 27.37 kg/m²     "

## 2019-08-02 NOTE — PT/OT/SLP EVAL
Occupational Therapy   Evaluation/ Treatment    Name: Raoul Logan  MRN: [id number]  Admitting Diagnosis:  S/P hip replacement, left Day of Surgery    Recommendations:     Discharge recommendations: home w/ HH    Barriers to discharge: None    Equipment recommendations: rolling walker    History:     Occupational Profile:  Living Environment: Pt lives with his gf in [facility] w/ ramp entry followed by 1 MARK front door. Pt uses a tub/shower and walk-in shower  Previous level of function: PTA, pt reports being independent w/ ADLs and functional mobility   Equipment Owned: bedside commode, shower chair and SPC - owns, does not use   Assistance Upon Discharge: Pt states he will have assistance from his g/f and mother upon d/c.     History reviewed. No pertinent past medical history.    Past Surgical History:   Procedure Laterality Date    COLONOSCOPY N/A 7/1/2019    Performed by [facility] at [facility] (4TH FLR)    EGD (ESOPHAGOGASTRODUODENOSCOPY) N/A 7/1/2019    Performed by Petros Ware MD at [facility] ENDO (4TH FLR)    KNEE SURGERY Left 1991       Subjective     Communicated with: RN prior to session.    Pt agreeable to Evaluation.    Pain/Comfort:  Pain level: 2/10 in L hip    Objective:     Pt found supine in bed with blood pressure cuff, telemetry, pulse ox, Peripheral IV and FCD.    Orthopedic Precautions: LLE weight bearing as tolerated; LLE anterior precautions.     Occupational Performance:    Bed Mobility:    Supine to sit: CGA  Sit to supine: CGA    Transfers:    Sit<>Stand: CGA, SW    Ambulation:    Pt ambulated 3 steps forward, backward, and side stepped to HOB w/ CGA and SW - no signs of LOB or SOB noted.     Activities of Daily Living:  UE dressing: Maximum Assistance to vicky gown around back  LE dressing: Total Assistance to vicky socks    Therapeutic Activities and Exercises:  Pt educated on anterior hip precautions via verbalization and demonstration    Patient left supine in bed with all  lines intact and RN notified.    [facility] 6 Click: Self-care  [facility] Total Score: 16    Education:    Assessment:     Raoul Logan is a 44 y.o. male with a medical diagnosis of S/P hip replacement, left.  He presents with decreased function of L LE impeding his ability to perform ADLs and functional mobility and would benefit from OT services to maximize functional (I) and safety.    Performance deficits affecting function are weakness, impaired endurance, impaired self care skills, impaired functional mobilty, gait instability, impaired balance, decreased lower extremity function, decreased safety awareness, pain, impaired skin, decreased ROM, edema, orthopedic precautions.    Rehab Prognosis:  Good    Plan:     Patient to be seen QD to address the above listed problems via self-care/home management, therapeutic activities, therapeutic exercises    Plan of Care Reviewed with: patient    This Plan of care has been discussed with the patient who was involved in its development and understands and is in agreement with the identified goals and treatment plan    GOALS:   Multidisciplinary Problems     Occupational Therapy Goals        Problem: Occupational Therapy Goal    Goal Priority Disciplines Outcome Interventions   Occupational Therapy Goal     OT, PT/OT Ongoing (interventions implemented as appropriate)    Description:  Goals to be met by: 8/7/2019    Patient will increase functional independence with ADLs by performing:    UE Dressing with Supervision.  LE Dressing with Supervision with AD as needed.  Grooming while standing with Supervision.  Toileting from bedside commode with Supervision for hygiene and clothing management.   Stand pivot transfers with Supervision.  Toilet transfer to bedside commode with Supervision.                         Time Tracking:     OT Received On: 8/2/2019  OT Start Time: 1130  OT Stop Time: 1150   OT Total Time: 20 minutes     Billable Minutes:  Evaluation 12  minutes  Therapeutic Activity 8 minutes    Callie Alejandro, OT  8/2/2019

## 2019-08-02 NOTE — PLAN OF CARE
Problem: Physical Therapy Goal  Goal: Physical Therapy Goal  Goals to be met by: 19    Patient will increase functional independence with mobility by performin. Supine to sit with supervision  2. Sit to supine with supervision  3. Sit to stand transfer with Supervision  4. Gait x200 feet with Supervision using Rolling Walker  5. Ascend/Descend 1 curb step with Supervision using Rolling Walker  6. Lower extremity exercise program x30 reps per handout, with supervision      Patient tolerated PT evaluation well today. He ambulated 3 steps forward, backward, and side stepped to HOB with SW and CGA.

## 2019-08-02 NOTE — OP NOTE
Peter LOCKHART left hip  OPERATIVE NOTE      Date of Operation:   8/2/2019    Providers Performing:   Surgeon(s):  MD Edgar Kitchen III, MD Assistant      Preoperative Diagnosis:   Left hip osteoarthritis, M16.12    Postoperative Diagnosis:   Same    Operative Procedure:   Left Total Hip Arthroplasty, Anterior Approach, CPT 93237    Anesthesia:   Spinal/Epidural    Estimated Blood Loss:   200 cc    Specimens:   Femoral head sent for gross pathology per protocol    Complications:   None, stable to PACU.    Implants Utilized:   Depuy  [place] Gription; Size 54  [place] Altrx polyethylene liner; 54 OD, 36 ID, neutral  Actis size 6 STD  Biolox Delta Ceramic 12/14 taper 36mm + 1.5  Acetabular screw 40mm, 20mm    Implant Name Type Inv. Item Serial No.  Lot No. LRB No. Used Action   CUP 54MM - [id number]  CUP 54MM  [employer] [id number] Left 1 Implanted   SCREW PINNACLE 6.5 X 20 - [id number]  SCREW PINNACLE 6.5 X 20  [employer] [id number] Left 1 Implanted   SCREW BONE ACT CANCELLO 6.5X40 - [id number]  SCREW BONE ACT CANCELLO 6.5X40  [employer] [id number] Left 1 Implanted   LINE ACET PINN 36X54 ALTRX - [id number]  LINE ACET PINN 36X54 ALTRX  [employer] [id number] Left 1 Implanted   STEM ACTIS DUOFIX STD SZ 6 - [id number]  STEM ACTIS DUOFIX STD SZ 6  [employer] [id number] Left 1 Implanted   HEAD FEM 12/14 TAPER 1.5X36 - [id number]  HEAD FEM 12/14 TAPER 1.5X36  [employer] [id number] Left 1 Implanted       Indications:   The patient has longstanding left hip pain secondary to primary osteoarthritis. They have failed conservative management which includes anti-inflammatory medications and activity modification. We reviewed the risks and benefits of the direct anterior hip replacement with the patient prior to surgery including risk of infection, lateral thigh numbness, blood clot, leg length inequality, dislocation, components loosening, components wearing out, need for revision surgery, continued pain,  limping, and injury to nerves and vessels.  After discussing the risks and benefits of the procedure they would like to proceed with surgery.    Operative Notes:   The patient was met in the holding area. The operative site was marked. Anesthesia administered spinal anesthesia. The patient was placed supine on a [place] table and the operative site was identified. The hip was prepped and draped in the usual fashion. IV antibiotics were administered and a timeout was performed.     I performed a direct anterior approach to the hip. Skin incision was made and the fascia of the tensor fascia hollis was identified. I utilized the interval between the tensor fascia hollis and sartorious for direct dissection to the hip joint. I identified and coagulated the ascending branch of the lateral circumflex vessel. Standard retractors over the anterior hip capsule were placed and the capsulotomy was performed. The capsule was tagged for retraction. The femoral head was identified and the femoral neck osteotomy was made in accordance with preoperative templating. The femoral head was then removed with a corkscrew.    The standard anterior, posterior, and superior retractors were placed around the acetabulum. The capsular labrum and foveal contents were removed. Sequential acetabular reamers were used to prepare the acetabulum. Once good good fit was achieved, the final acetabular cup was selected to be one millimeter larger in diameter than the last reamer used. The cup was checked for a good rim fit and a provisional impaction was performed to verify positioning on fluoroscopy. Once this was satisfactory, the impaction was completed. I checked to make sure there was no overhanging osteophytes anteriorly as well as making sure that there was not excessive acetabular cup exposed. Screws were placed in the cup to augment initial fixation. The final liner was impacted into place and I confirmed that it was well seated.    The hydraulic  hook was placed around the femur. The femur was externally rotated and the standard calcar and greater trochanter retractors were placed. A provisional posterior capsulotomy was performed. Then, gross traction was released and the leg was extended and adducted. The appropriate release was performed to allow elevation of the femur for good visualization of the femoral canal.     A box osteotome was used to open the femoral canal and a canal finder was used to sound the canal. The starter broach and sequential broaches were used until stability was appropriate. A trial reduction was performed and intraoperative fluoroscopy was used to confirm appropriate leg lengths and offset.  Once the trial femoral components appeared appropriately positioned, the hip was dislocated, the femur re-exposed, and the trial femoral components were removed. The canal was irrigated and the final femoral stem was impacted to the level of the neck cut. The final ball was impacted onto a dry trunnion and the hip was reduced checking for appropriate soft tissue tension. Final intra-operative fluoroscopy was obtained to confirm implant positioning, leg length, and offset.     While checking xray, a 0.35% betadine solution was left to soak in the wound. The wound was copiously irrigated. I checked for any residual bleeders and made sure that there was appropriate hemostasis. The local anesthetic cocktail was administered. 1 gram of vancomycin powder was placed in the wound. The wound was closed in layers using #1 vicryl at the fascia, then 2-0 vicryl, 3-0 monocryl, 4-0 monocryl and skin glue at the skin. A sterile dressing was placed.     There were no complications or evidence of intraoperative fracture. All counts were correct.    The first-assistant was critical to all steps of the operation, including retraction during exposure and bone preparation, as well as the deep and superficial wound closure.  Dr. Green performed and/or supervised  the key portions of this surgical procedure, including evaluation of the bone cuts, reshaping of the bony elements, and insertion of the provisional and final components.    Post Op Plan:   The patient will be weightbearing as tolerated with a walker with no extremes of motion  ASA for DVT prophylaxis (81mg BID for one month)  24 hours of IV antibiotics.      Signed by: Rakesh Green III, MD

## 2019-08-02 NOTE — ANESTHESIA POSTPROCEDURE EVALUATION
Anesthesia Post Evaluation    Patient: Raoul Logan    Procedure(s) Performed: Procedure(s) (LRB):  ARTHROPLASTY, HIP, ANTERIOR APPROACH- DEPUY-ACTIS+PINNACLE (Left)    Final Anesthesia Type: CSE  Patient location during evaluation: PACU  Patient participation: Yes- Able to Participate  Level of consciousness: awake and alert  Post-procedure vital signs: reviewed and stable  Pain management: adequate  Airway patency: patent  PONV status at discharge: No PONV  Anesthetic complications: no      Cardiovascular status: blood pressure returned to baseline  Respiratory status: unassisted, spontaneous ventilation and room air  Hydration status: euvolemic  Follow-up not needed.          Vitals Value Taken Time   /91 8/2/2019 12:15 PM   Temp 36.4 °C (97.5 °F) 8/2/2019 12:00 PM   Pulse 65 8/2/2019 12:19 PM   Resp 7 8/2/2019 12:19 PM   SpO2 100 % 8/2/2019 12:19 PM   Vitals shown include unvalidated device data.      Event Time     Out of Recovery 12:21:36          Pain/Nikolay Score: Pain Rating Prior to Med Admin: 2 (8/2/2019 11:34 AM)  Pain Rating Post Med Admin: 2 (8/2/2019 11:40 AM)  Nikolay Score: 10 (8/2/2019 11:40 AM)

## 2019-08-03 VITALS
HEIGHT: 68 IN | DIASTOLIC BLOOD PRESSURE: 95 MMHG | HEART RATE: 70 BPM | TEMPERATURE: 98 F | WEIGHT: 180 LBS | BODY MASS INDEX: 27.28 KG/M2 | SYSTOLIC BLOOD PRESSURE: 149 MMHG | OXYGEN SATURATION: 100 % | RESPIRATION RATE: 18 BRPM

## 2019-08-03 LAB
ALBUMIN SERPL BCP-MCNC: 3.8 G/DL (ref 3.5–5.2)
ALP SERPL-CCNC: 88 U/L (ref 55–135)
ALT SERPL W/O P-5'-P-CCNC: 23 U/L (ref 10–44)
ANION GAP SERPL CALC-SCNC: 10 MMOL/L (ref 8–16)
AST SERPL-CCNC: 19 U/L (ref 10–40)
BILIRUB SERPL-MCNC: 0.5 MG/DL (ref 0.1–1)
BUN SERPL-MCNC: 11 MG/DL (ref 6–20)
CALCIUM SERPL-MCNC: 9.2 MG/DL (ref 8.7–10.5)
CHLORIDE SERPL-SCNC: 106 MMOL/L (ref 95–110)
CO2 SERPL-SCNC: 22 MMOL/L (ref 23–29)
CREAT SERPL-MCNC: 0.8 MG/DL (ref 0.5–1.4)
EST. GFR  (AFRICAN AMERICAN): >60 ML/MIN/1.73 M^2
EST. GFR  (NON AFRICAN AMERICAN): >60 ML/MIN/1.73 M^2
GLUCOSE SERPL-MCNC: 99 MG/DL (ref 70–110)
HGB BLD-MCNC: 11.4 G/DL (ref 14–18)
POTASSIUM SERPL-SCNC: 3.8 MMOL/L (ref 3.5–5.1)
PROT SERPL-MCNC: 6.7 G/DL (ref 6–8.4)
SODIUM SERPL-SCNC: 138 MMOL/L (ref 136–145)

## 2019-08-03 PROCEDURE — 85018 HEMOGLOBIN: CPT

## 2019-08-03 PROCEDURE — 97530 THERAPEUTIC ACTIVITIES: CPT

## 2019-08-03 PROCEDURE — 97535 SELF CARE MNGMENT TRAINING: CPT

## 2019-08-03 PROCEDURE — 99231 PR SUBSEQUENT HOSPITAL CARE,LEVL I: ICD-10-PCS | Mod: ,,, | Performed by: ANESTHESIOLOGY

## 2019-08-03 PROCEDURE — 99231 SBSQ HOSP IP/OBS SF/LOW 25: CPT | Mod: ,,, | Performed by: ANESTHESIOLOGY

## 2019-08-03 PROCEDURE — S4991 NICOTINE PATCH NONLEGEND: HCPCS | Performed by: STUDENT IN AN ORGANIZED HEALTH CARE EDUCATION/TRAINING PROGRAM

## 2019-08-03 PROCEDURE — 97116 GAIT TRAINING THERAPY: CPT

## 2019-08-03 PROCEDURE — 25000003 PHARM REV CODE 250: Performed by: PHYSICIAN ASSISTANT

## 2019-08-03 PROCEDURE — 36415 COLL VENOUS BLD VENIPUNCTURE: CPT

## 2019-08-03 PROCEDURE — 80053 COMPREHEN METABOLIC PANEL: CPT

## 2019-08-03 PROCEDURE — 25000003 PHARM REV CODE 250: Performed by: STUDENT IN AN ORGANIZED HEALTH CARE EDUCATION/TRAINING PROGRAM

## 2019-08-03 RX ADMIN — SENNOSIDES,DOCUSATE SODIUM 1 TABLET: 8.6; 5 TABLET, FILM COATED ORAL at 08:08

## 2019-08-03 RX ADMIN — OXYCODONE HYDROCHLORIDE 10 MG: 10 TABLET ORAL at 11:08

## 2019-08-03 RX ADMIN — OXYCODONE HYDROCHLORIDE 10 MG: 10 TABLET ORAL at 08:08

## 2019-08-03 RX ADMIN — POLYETHYLENE GLYCOL 3350 17 G: 17 POWDER, FOR SOLUTION ORAL at 08:08

## 2019-08-03 RX ADMIN — ACETAMINOPHEN 1000 MG: 500 TABLET ORAL at 11:08

## 2019-08-03 RX ADMIN — MUPIROCIN 1 G: 20 OINTMENT TOPICAL at 08:08

## 2019-08-03 RX ADMIN — CELECOXIB 200 MG: 200 CAPSULE ORAL at 08:08

## 2019-08-03 RX ADMIN — OXYCODONE HYDROCHLORIDE 10 MG: 10 TABLET ORAL at 05:08

## 2019-08-03 RX ADMIN — ACETAMINOPHEN 1000 MG: 500 TABLET ORAL at 05:08

## 2019-08-03 RX ADMIN — ASPIRIN 81 MG: 81 TABLET, COATED ORAL at 08:08

## 2019-08-03 RX ADMIN — NICOTINE 1 PATCH: 21 PATCH, EXTENDED RELEASE TRANSDERMAL at 08:08

## 2019-08-03 RX ADMIN — FAMOTIDINE 20 MG: 20 TABLET, FILM COATED ORAL at 08:08

## 2019-08-03 RX ADMIN — OXYCODONE HYDROCHLORIDE 10 MG: 10 TABLET ORAL at 01:08

## 2019-08-03 NOTE — ADDENDUM NOTE
Addendum  created 08/03/19 1255 by Jammie Kaur MD    Charge Capture section accepted, Sign clinical note

## 2019-08-03 NOTE — SUBJECTIVE & OBJECTIVE
"Principal Problem:S/P hip replacement, left     Principal Orthopedic Problem: Same    Interval History: Pt seen and examined at bedside. MIKEY. He reports pain is controlled. He plans to work with PT today    Review of patient's allergies indicates:   Allergen Reactions    Pcn [penicillins] Anaphylaxis       Current Facility-Administered Medications   Medication    0.9%  NaCl infusion    acetaminophen tablet 1,000 mg    aspirin EC tablet 81 mg    bisacodyl suppository 10 mg    celecoxib capsule 200 mg    famotidine tablet 20 mg    morphine injection 2 mg    morphine injection 2 mg    morphine injection 2 mg    mupirocin 2 % ointment 1 g    naloxone 0.4 mg/mL injection 0.02 mg    nicotine 21 mg/24 hr 1 patch    ondansetron injection 4 mg    oxyCODONE immediate release tablet 10 mg    oxyCODONE immediate release tablet 15 mg    oxyCODONE immediate release tablet 5 mg    polyethylene glycol packet 17 g    pregabalin capsule 150 mg    promethazine (PHENERGAN) 6.25 mg in dextrose 5 % 50 mL IVPB    ramelteon tablet 8 mg    senna-docusate 8.6-50 mg per tablet 1 tablet     Objective:     Vital Signs (Most Recent):  Temp: 98.2 °F (36.8 °C) (08/03/19 0353)  Pulse: 77 (08/03/19 0353)  Resp: 18 (08/03/19 0353)  BP: (!) 147/94 (08/03/19 0353)  SpO2: 99 % (08/03/19 0353) Vital Signs (24h Range):  Temp:  [96 °F (35.6 °C)-98.2 °F (36.8 °C)] 98.2 °F (36.8 °C)  Pulse:  [61-85] 77  Resp:  [10-19] 18  SpO2:  [96 %-100 %] 99 %  BP: ()/(53-94) 147/94     Weight: 81.6 kg (180 lb)  Height: 5' 8" (172.7 cm)  Body mass index is 27.37 kg/m².      Intake/Output Summary (Last 24 hours) at 8/3/2019 0545  Last data filed at 8/2/2019 1800  Gross per 24 hour   Intake 3250 ml   Output 1300 ml   Net 1950 ml       Ortho/SPM Exam    AAOx4  NAD  Reg rate  No increased WOB  Dressing c/d/i  SILT T/SP/DP/Lyman/Sa  Motor intact T/SP/DP  WWP extremities  FCDs in place and functioning      Significant Labs: All pertinent labs within " the past 24 hours have been reviewed.   Hgb 11.4     Significant Imaging: I have reviewed and interpreted all pertinent imaging results/findings.

## 2019-08-03 NOTE — PROGRESS NOTES
"[facility]  Orthopedics  Progress Note    Patient Name: Raoul Logna  MRN: [id number]  Admission Date: 8/2/2019  Hospital Length of Stay: 1 days  Attending Provider: Rakesh Green III, MD  Primary Care Provider: José Miguel Carroll MD  Follow-up For: Procedure(s) (LRB):  ARTHROPLASTY, HIP, ANTERIOR APPROACH- DEPUY-ACTIS+PINNACLE (Left)    Post-Operative Day: 1 Day Post-Op  Subjective:     Principal Problem:S/P hip replacement, left     Principal Orthopedic Problem: Same    Interval History: Pt seen and examined at bedside. MIKEY. He reports pain is controlled. He plans to work with PT today    Review of patient's allergies indicates:   Allergen Reactions    Pcn [penicillins] Anaphylaxis       Current Facility-Administered Medications   Medication    0.9%  NaCl infusion    acetaminophen tablet 1,000 mg    aspirin EC tablet 81 mg    bisacodyl suppository 10 mg    celecoxib capsule 200 mg    famotidine tablet 20 mg    morphine injection 2 mg    morphine injection 2 mg    morphine injection 2 mg    mupirocin 2 % ointment 1 g    naloxone 0.4 mg/mL injection 0.02 mg    nicotine 21 mg/24 hr 1 patch    ondansetron injection 4 mg    oxyCODONE immediate release tablet 10 mg    oxyCODONE immediate release tablet 15 mg    oxyCODONE immediate release tablet 5 mg    polyethylene glycol packet 17 g    pregabalin capsule 150 mg    promethazine (PHENERGAN) 6.25 mg in dextrose 5 % 50 mL IVPB    ramelteon tablet 8 mg    senna-docusate 8.6-50 mg per tablet 1 tablet     Objective:     Vital Signs (Most Recent):  Temp: 98.2 °F (36.8 °C) (08/03/19 0353)  Pulse: 77 (08/03/19 0353)  Resp: 18 (08/03/19 0353)  BP: (!) 147/94 (08/03/19 0353)  SpO2: 99 % (08/03/19 0353) Vital Signs (24h Range):  Temp:  [96 °F (35.6 °C)-98.2 °F (36.8 °C)] 98.2 °F (36.8 °C)  Pulse:  [61-85] 77  Resp:  [10-19] 18  SpO2:  [96 %-100 %] 99 %  BP: ()/(53-94) 147/94     Weight: 81.6 kg (180 lb)  Height: 5' 8" (172.7 " cm)  Body mass index is 27.37 kg/m².      Intake/Output Summary (Last 24 hours) at 8/3/2019 0545  Last data filed at 8/2/2019 1800  Gross per 24 hour   Intake 3250 ml   Output 1300 ml   Net 1950 ml       Ortho/SPM Exam    AAOx4  NAD  Reg rate  No increased WOB  Dressing c/d/i  SILT T/SP/DP/Lyman/Sa  Motor intact T/SP/DP  WWP extremities  FCDs in place and functioning      Significant Labs: All pertinent labs within the past 24 hours have been reviewed.   Hgb 11.4     Significant Imaging: I have reviewed and interpreted all pertinent imaging results/findings.       Assessment/Plan:     * S/P hip replacement, left  44 y.o. male POD1 s/p L anterior DORITA    Pain control: multimodal  PT/OT: WBAT LLE with walker, avoid extremes of ROM, 0-90 deg hip flexion  DVT PPx: ASA 81 BID, FCDs at all times when not ambulating  Abx: postop Clinda due to penicillin allergy  Labs: Hgb 11.4  Drain: none  Cardoza: none    Dispo: f/u PT recs              Ismael Boo MD  Orthopedics  [facility]

## 2019-08-03 NOTE — PT/OT/SLP PROGRESS
Occupational Therapy   Treatment/ Discharge Summary    Name: Raoul Logan  MRN: [id number]  Admitting Diagnosis:  S/P hip replacement, left  1 Day Post-Op    Recommendations:     Discharge Recommendations: home with home health  Discharge Equipment Recommendations:  walker, rolling, commode  Barriers to discharge:  None    Assessment:     Raoul Logan is a 44 y.o. male with a medical diagnosis of S/P hip replacement, left.  He presents with no further acute OT needs at this time. Pt is performing all self-care and functional mobility tasks safely and appropriately s/p L DORITA. Pt will benefit from further therapy w/ HH to maximize his functional independence and safety w/ all functional tasks within his home environment.      Rehab Prognosis:  Good; patient would benefit from acute skilled OT services to address these deficits and reach maximum level of function.       Plan:     Patient to be seen (d/c from acute OT - please reconsult if anything changes ) to address the above listed problems via    · Plan of Care Expires:    · Plan of Care Reviewed with: patient, significant other    Subjective     Pain/Comfort:  · Pain Rating 1: 5/10  · Location - Side 1: Left  · Location - Orientation 1: generalized  · Location 1: hip  · Pain Addressed 1: Reposition, Pre-medicate for activity, Cessation of Activity  · Pain Rating Post-Intervention 1: 6/10    Objective:     Communicated with: RN prior to session.  Patient found HOB elevated with FCD, peripheral IV upon OT entry to room.    General Precautions: Standard, fall   Orthopedic Precautions:LLE weight bearing as tolerated, LLE anterior precautions   Braces: N/A     Occupational Performance:     Bed Mobility:    · Patient completed Scooting/Bridging with modified independence  · Patient completed Supine to Sit with modified independence     Functional Mobility/Transfers:  · Patient completed Sit <> Stand Transfer with stand by assistance  with  rolling walker    · Patient completed Toilet Transfer Step Transfer technique with supervision with  rolling walker  · Functional Mobility: Pt ambulated ~100ft w/ RW and supervision. Pt demonstrated good understanding of gait sequence and RW management    Activities of Daily Living:  · Grooming: supervision standing at sink w/ good activity tolerance washing hands   · Upper Body Dressing: independence donning overhead tshirt  · Lower Body Dressing: supervision and cues for adaptive technique sequence donning LB garments  · Toileting: modified independence for carole-hygiene and clothing management      [facility] 6 Click ADL: 24    Treatment & Education:  - OT POC  - Importance of OOB activity to maximize recovery   - safety w/ functional mobility; hand placement to ensure safe transfers to various surfaces in prep for ADLs  - Reviewed gait sequence and RW management via verbalization and demonstration   - Reviewed all anterior hip precautions via verbalization and demonstration  - Educated on adaptive techniques to facilitate LBD tasks  - encouraged to ambulate at least every hour to hour 1/2 within home environment to increase overall strength and endurance        Patient left up in chair with all lines intact, call button in reach, RN notified and girlfriend  presentEducation:      GOALS:   Multidisciplinary Problems     Occupational Therapy Goals     Not on file          Multidisciplinary Problems (Resolved)        Problem: Occupational Therapy Goal    Goal Priority Disciplines Outcome Interventions   Occupational Therapy Goal   (Resolved)     OT, PT/OT Outcome(s) achieved    Description:  Goals to be met by: 8/7/2019    Patient will increase functional independence with ADLs by performing:    UE Dressing with Supervision.  LE Dressing with Supervision with AD as needed.  Grooming while standing with Supervision.  Toileting from bedside commode with Supervision for hygiene and clothing management.   Stand pivot transfers with  Supervision.  Toilet transfer to bedside commode with Supervision.                         Time Tracking:     OT Date of Treatment: 08/03/19  OT Start Time: 0823  OT Stop Time: 0850  OT Total Time (min): 27 min    Billable Minutes:Self Care/Home Management 17  Therapeutic Activity 10    Callie Alejandro OT  8/3/2019

## 2019-08-03 NOTE — PLAN OF CARE
Problem: Physical Therapy Goal  Goal: Physical Therapy Goal  Goals to be met by: 19    Patient will increase functional independence with mobility by performin. Supine to sit with supervision- MET  2. Sit to supine with supervision- MET  3. Sit to stand transfer with Supervision- MET  4. Gait x200 feet with Supervision using Rolling Walker- MET  5. Ascend/Descend 1 curb step with Supervision using Rolling Walker- MET  6. Lower extremity exercise program x30 reps per handout, with supervision     Outcome: Ongoing (interventions implemented as appropriate)  Patient has progressed well towards his goals as evidence of goals met.

## 2019-08-03 NOTE — ANESTHESIA POST-OP PAIN MANAGEMENT
Acute Pain Service and Perioperative Surgical Home Progress Note    Interval history  Raoul Logan is a 44 y.o., male,     Surgery:  Procedure(s) (LRB):  ARTHROPLASTY, HIP, ANTERIOR APPROACH- DEPUY-ACTIS+PINNACLE (Left)    Post Op Day #: 1    Catheter type: PENG block    Infusion type: N/A    Problem List:    Active Hospital Problems    Diagnosis  POA    *S/P hip replacement, left [Z96.642]  Not Applicable      Resolved Hospital Problems   No resolved problems to display.       Subjective:       General appearance of alert, oriented, no complaints   Pain with rest: 1    Numbers   Pain with movement: 4    Numbers   Side Effects    1. Pruritis No    2. Nausea No    3. Motor Blockade No, 0=Ability to raise lower extremities off bed    4. Sedation No, 1=awake and alert    Schedule Medications:    acetaminophen  1,000 mg Oral Q6H    aspirin  81 mg Oral BID    celecoxib  200 mg Oral Daily    famotidine  20 mg Oral BID    mupirocin  1 g Nasal BID    nicotine  1 patch Transdermal Daily    polyethylene glycol  17 g Oral Daily    pregabalin  150 mg Oral QHS    senna-docusate 8.6-50 mg  1 tablet Oral BID        Continuous Infusions:       PRN Medications:  bisacodyl, morphine, morphine, morphine, naloxone, ondansetron, oxyCODONE, oxyCODONE, oxyCODONE, promethazine (PHENERGAN) IVPB, ramelteon       Antibiotics:  Antibiotics (From admission, onward)    Start     Stop Route Frequency Ordered    08/02/19 2100  mupirocin 2 % ointment 1 g      08/07 2059 Nasl 2 times daily 08/02/19 0914             Objective:    Vital Signs (Most Recent):  Temp: 35.7 °C (96.3 °F) (08/03/19 0740)  Pulse: 72 (08/03/19 0740)  Resp: 18 (08/03/19 0740)  BP: (!) 157/88 (08/03/19 0740)  SpO2: 99 % (08/03/19 0740) Vital Signs Range (Last 24H):  Temp:  [35.6 °C (96 °F)-36.8 °C (98.2 °F)]   Pulse:  [61-85]   Resp:  [10-18]   BP: (132-157)/(80-94)   SpO2:  [96 %-99 %]          I & O (Last 24H):    Intake/Output Summary (Last 24 hours) at  8/3/2019 1130  Last data filed at 8/3/2019 0900  Gross per 24 hour   Intake 1600 ml   Output 400 ml   Net 1200 ml       Physical Exam:    GA: Alert, comfortable, no acute distress.   Pulmonary: Clear to auscultation A/P/L. No wheezing, crackles, or rhonchi.  Cardiac: RRR S1 & S2 w/o rubs/murmurs/gallops.   Abdominal:Bowel sounds present. No tenderness to palpation or distension. No appreciable hepatosplenomegaly.   Skin: No jaundice, rashes, or visible lesions.         Laboratory:  CBC:   Recent Labs     08/03/19  0349   HGB 11.4*       BMP:   Recent Labs     08/03/19  0824      K 3.8   CO2 22*      BUN 11   CREATININE 0.8   GLU 99   CALCIUM 9.2       No results for input(s): PT, INR, PROTIME, APTT in the last 72 hours.      Anticoagulant dose per orthopedics    Assessment:         Pain control adequate    Plan:     1) Pain: Well-controlled with PENG block and multimodal regimen  Will continue to monitor.   2) FEN/GI: Tolerating regular diet   5) Dispo: Pt working well with PT/OT. Plan for D/C today.           Evaluator Hermes Morrow MD    I have personally interviewed and examined the patient at bedside with the resident, Dr. Morrow. I reviewed the resident's note, assessment, and plan and agree with the findings. Jammie Kaur MD

## 2019-08-03 NOTE — PLAN OF CARE
Problem: Occupational Therapy Goal  Goal: Occupational Therapy Goal  Goals to be met by: 8/7/2019    Patient will increase functional independence with ADLs by performing:    UE Dressing with Supervision.  LE Dressing with Supervision with AD as needed.  Grooming while standing with Supervision.  Toileting from bedside commode with Supervision for hygiene and clothing management.   Stand pivot transfers with Supervision.  Toilet transfer to bedside commode with Supervision.        Outcome: Outcome(s) achieved Date Met: 08/03/19  Pt has successfully achieved his functional outcomes and is safe to d/c from acute OT    Comments: D/c from acute OT

## 2019-08-03 NOTE — PROGRESS NOTES
Discharge Note.pt discharged jome alert and oriented x4, sjin warm to touch, denies any pain. Pt verbalized understanding of discharged teaching.

## 2019-08-03 NOTE — PT/OT/SLP PROGRESS
Physical Therapy Treatment    Patient Name:  Raoul Logan   MRN:  [id number]    Recommendations:     Discharge Recommendations:  home with home health   Discharge Equipment Recommendations: commode, walker, rolling   Barriers to discharge: None    Assessment:     Raoul Logan is a 44 y.o. male admitted with a medical diagnosis of S/P hip replacement, left.  He presents with the following impairments/functional limitations:  decreased ROM, orthopedic precautions . Patient ambulates with min antalgic gait pattern and L hip with min internal rotation. Patient showed good mobility, which improved as confidence and treatment progressed.    Rehab Prognosis: Good; patient would benefit from acute skilled PT services to address these deficits and reach maximum level of function.    Recent Surgery: Procedure(s) (LRB):  ARTHROPLASTY, HIP, ANTERIOR APPROACH- DEPUY-ACTIS+PINNACLE (Left) 1 Day Post-Op    Plan:     During this hospitalization, patient to be seen daily to address the identified rehab impairments via gait training, therapeutic activities, therapeutic exercises and progress toward the following goals:    · Plan of Care Expires:  08/09/19    Subjective     Chief Complaint: L Hip tightness  Patient/Family Comments/goals: to go home today.  Pain/Comfort:  · Pain Rating 1: 2/10  · Location - Side 1: Left  · Location - Orientation 1: generalized  · Location 1: hip  · Pain Addressed 1: Reposition, Distraction  · Pain Rating Post-Intervention 1: 2/10      Objective:     Communicated with nsg prior to session.  Patient found up in chair with FCD, peripheral IV upon PT entry to room.     General Precautions: Standard, fall   Orthopedic Precautions:LLE weight bearing as tolerated, LLE anterior precautions   Braces: N/A     Functional Mobility:  · Bed Mobility:     · Supine to Sit: supervision  · Transfers:     · Sit to Stand:  supervision with rolling walker  · Bed to Chair: supervision with  rolling walker  using   Stand Pivot  · Gait: 300 ft with RW and Supervision, with cues to correct posture.      AM-PAC 6 CLICK MOBILITY  Turning over in bed (including adjusting bedclothes, sheets and blankets)?: 4  Sitting down on and standing up from a chair with arms (e.g., wheelchair, bedside commode, etc.): 4  Moving from lying on back to sitting on the side of the bed?: 4  Moving to and from a bed to a chair (including a wheelchair)?: 4  Need to walk in hospital room?: 4  Climbing 3-5 steps with a railing?: 4  Basic Mobility Total Score: 24       Therapeutic Activities and Exercises:   Doffed/Donned FCD's. Reviewed L hip precautions and educated on posture and AP's to perform every 2 hours.  4 steps up and down using Handrail with SBA.    Patient left up in chair with all lines intact, call button in reach and significant other. present..    GOALS:   Multidisciplinary Problems     Physical Therapy Goals        Problem: Physical Therapy Goal    Goal Priority Disciplines Outcome Goal Variances Interventions   Physical Therapy Goal     PT, PT/OT Ongoing (interventions implemented as appropriate)     Description:  Goals to be met by: 19    Patient will increase functional independence with mobility by performin. Supine to sit with supervision- MET  2. Sit to supine with supervision- MET  3. Sit to stand transfer with Supervision- MET  4. Gait x200 feet with Supervision using Rolling Walker- MET  5. Ascend/Descend 1 curb step with Supervision using Rolling Walker- MET  6. Lower extremity exercise program x30 reps per handout, with supervision                       Time Tracking:     PT Received On: 19  PT Start Time: 1025     PT Stop Time: 1049  PT Total Time (min): 24 min     Billable Minutes: Gait Training 12 and Therapeutic Activity 12    Treatment Type: Treatment  PT/PTA: PTA     PTA Visit Number: Carlos Alebrto     Cecil Mustafa, PTA  2019

## 2019-08-03 NOTE — ASSESSMENT & PLAN NOTE
44 y.o. male POD1 s/p L anterior DORITA    Pain control: multimodal  PT/OT: WBAT LLE with walker, avoid extremes of ROM, 0-90 deg hip flexion  DVT PPx: ASA 81 BID, FCDs at all times when not ambulating  Abx: postop Clinda due to penicillin allergy  Labs: Hgb 11.4  Drain: none  Cardoza: none    Dispo: f/u PT recs

## 2019-08-03 NOTE — PLAN OF CARE
Problem: Bowel Elimination Impaired (Hip Arthroplasty)  Goal: Effective Bowel Elimination    Intervention: Promote Effective Bowel Elimination  Pt passing gas, no bm noted.

## 2019-08-04 NOTE — DISCHARGE SUMMARY
"[facility]  Orthopedics  Discharge Summary      Patient Name: Raoul Logan  MRN: [id number]  Admission Date: 8/2/2019  Hospital Length of Stay: 1 days  Discharge Date and Time: 8/3/2019  2:23 PM  Attending Physician: No att. providers found   Discharging Provider: Ismael Boo MD  Primary Care Provider: José Miguel Carroll MD    HPI: CC: Left hip pain     Raoul Logan has had moderate-severe pain in Left hip for greater than one year. Pain is worse with activity and weight bearing.  Patient has experienced interference of activities of daily living due to increased pain, decreased range of motion, and pain with passive range of motion. Patient has failed non-operative treatment including NSAIDs, as well as greater than 3 months of activity modification. Physical therapy was contraindicated in this patient due to pain.  Raoul Logan currently ambulates independently.      History reviewed. No pertinent past medical history    Procedure(s) (LRB):  ARTHROPLASTY, HIP, ANTERIOR APPROACH- DEPUY-ACTIS+PINNACLE (Left)      Hospital Course: Patient presented for above procedure.  Tolerated it well and was discharged home POD1 after voiding, tolerating diet, ambulating, pain controlled.  Discharge instructions, follow-up appointment, and med rec are below.          Significant Diagnostic Studies: Labs: All labs within the past 24 hours have been reviewed    Pending Diagnostic Studies:     None        Final Active Diagnoses:    Diagnosis Date Noted POA    PRINCIPAL PROBLEM:  S/P hip replacement, left [Z96.642] 08/02/2019 Not Applicable      Problems Resolved During this Admission:      Discharged Condition: good    Disposition: [place] [place] [facility]    Follow Up:    Patient Instructions:      COMMODE FOR HOME USE     Order Specific Question Answer Comments   Type: Standard    Height: 5' 8" (1.727 m)    Weight: 81.6 kg (180 lb)    Does patient have medical equipment at home? walker, " "standard    Length of need (1-99 months): 99    Vendor: [facility]    Expected Date of Delivery: 8/2/2019      TRANSFER TUB BENCH FOR HOME USE     Order Specific Question Answer Comments   Type of Transfer Tub Bench: Padded    Height: 5' 8" (1.727 m)    Weight: 81.6 kg (180 lb)    Does patient have medical equipment at home? walker, standard    Length of need (1-99 months): 99    Vendor: Other (use comments)    Expected Date of Delivery: 8/2/2019      WALKER FOR HOME USE     Order Specific Question Answer Comments   Type of Walker: Adult (5'4"-6'6")    With wheels? Yes    Height: 5' 8" (1.727 m)    Weight: 81.6 kg (180 lb)    Length of need (1-99 months): 99    Does patient have medical equipment at home? walker, standard    Please check all that apply: Patient's condition impairs ambulation.    Vendor: [facility]    Expected Date of Delivery: 8/2/2019      Call MD for:  temperature >100.4     Call MD for:  persistent nausea and vomiting or diarrhea     Call MD for:  severe uncontrolled pain     Call MD for:  redness, tenderness, or signs of infection (pain, swelling, redness, odor or green/yellow discharge around incision site)     Call MD for:  difficulty breathing or increased cough     Call MD for:  severe persistent headache     Call MD for:  worsening rash     Call MD for:  persistent dizziness, light-headedness, or visual disturbances     Call MD for:  increased confusion or weakness     Leave dressing on - Keep it clean, dry, and intact until clinic visit     Ice to affected area     Other restrictions (specify):   Order Comments: Hip ROM 0-90     Activity as tolerated     Shower on day dressing removed (No bath)   Order Comments: Ok to shower at home, let water run over bandage and gently pat dry.  Do not soak in tub or pool.  Call Dr Green's office if there is any drainage     Medications:  Reconciled Home Medications:      Medication List      START taking these medications    acetaminophen 650 MG " Tbsr  Commonly known as:  TYLENOL  Take 1 tablet (650 mg total) by mouth every 6 (six) hours as needed (pain).  Replaces:  TYLENOL ORAL     aspirin 81 MG EC tablet  Commonly known as:  ECOTRIN  Take 1 tablet (81 mg total) by mouth 2 (two) times daily.     celecoxib 200 MG capsule  Commonly known as:  CeleBREX  Take 1 capsule (200 mg total) by mouth once daily.     oxyCODONE 5 MG immediate release tablet  Commonly known as:  ROXICODONE  Take 1 tablet (5 mg total) by mouth every 6 (six) hours as needed for Pain (1-2 as needed for pain).     STOOL SOFTENER 100 MG capsule  Generic drug:  docusate sodium  Take 1 capsule (100 mg total) by mouth 2 (two) times daily.        STOP taking these medications    TYLENOL ORAL  Replaced by:  acetaminophen 650 MG Tbsr            Ismael Boo MD  Orthopedics  [facility]

## 2019-08-05 ENCOUNTER — TELEPHONE (OUTPATIENT)
Dept: ORTHOPEDICS | Facility: CLINIC | Age: 44
End: 2019-08-05

## 2019-08-05 NOTE — TELEPHONE ENCOUNTER
Called Juan R back. No answer. Left VM asking him to leave on dressing until PO visit in clinic, unless there is drainage in which case he should call us back.     ----- Message from Brii Crawford MA sent at 8/5/2019 11:08 AM CDT -----  [phone]    ----- Message -----  From: Gretta Santana  Sent: 8/5/2019  10:12 AM  To: Peter RUVALCABA Staff     Called requesting a return call back regarding [facility] orders for his dressing for surgical incision. Orders are reporting to be different than what was requested. Please contact Juan R at number above

## 2019-08-08 ENCOUNTER — TELEPHONE (OUTPATIENT)
Dept: ORTHOPEDICS | Facility: CLINIC | Age: 44
End: 2019-08-08

## 2019-08-08 DIAGNOSIS — Z96.649 STATUS POST HIP REPLACEMENT, UNSPECIFIED LATERALITY: Primary | ICD-10-CM

## 2019-08-08 RX ORDER — OXYCODONE HYDROCHLORIDE 5 MG/1
5 TABLET ORAL EVERY 6 HOURS PRN
Qty: 30 TABLET | Refills: 0 | Status: CANCELLED | OUTPATIENT
Start: 2019-08-08

## 2019-08-08 RX ORDER — OXYCODONE HYDROCHLORIDE 5 MG/1
5 TABLET ORAL EVERY 6 HOURS PRN
Qty: 30 TABLET | Refills: 0 | Status: SHIPPED | OUTPATIENT
Start: 2019-08-08 | End: 2019-08-16

## 2019-08-08 NOTE — TELEPHONE ENCOUNTER
Patient has to get his pain medication from the surgeon that operated on him    Thanks  Dr. Carroll

## 2019-08-08 NOTE — TELEPHONE ENCOUNTER
----- Message from Dayana Clifton sent at 8/8/2019  2:39 PM CDT -----  Contact: Self   Refill : oxyCODONE (ROXICODONE) 5 MG immediate release tablet              celecoxib (CELEBREX) 200 MG capsule      [id number] - [place] - [address] AT [facility] [address]  [address] [place] [address]  Phone: [phone] Fax: [phone]

## 2019-08-08 NOTE — TELEPHONE ENCOUNTER
----- Message from Fredy Robison sent at 8/8/2019  3:45 PM CDT -----  Contact: Self/ [phone]  Rx Refill/Request     Is this a Refill or New Rx: Refill.   Rx Name and Strength:  oxyCODONE (ROXICODONE) 5 MG.   Preferred Pharmacy with phone number:  [id number] phone number [phone].   Communication Preference: .

## 2019-08-08 NOTE — PROGRESS NOTES
Roxicodone refilled as requested. Pt is 7 days s/p anterior hip. Next refill will be for norco 5/325mg

## 2019-08-12 DIAGNOSIS — Z96.649 STATUS POST HIP REPLACEMENT, UNSPECIFIED LATERALITY: Primary | ICD-10-CM

## 2019-08-12 RX ORDER — HYDROCODONE BITARTRATE AND ACETAMINOPHEN 5; 325 MG/1; MG/1
1-2 TABLET ORAL EVERY 6 HOURS PRN
Qty: 30 TABLET | Refills: 0 | Status: SHIPPED | OUTPATIENT
Start: 2019-08-12 | End: 2019-08-22

## 2019-08-12 RX ORDER — CELECOXIB 200 MG/1
200 CAPSULE ORAL DAILY
Qty: 30 CAPSULE | Refills: 0 | OUTPATIENT
Start: 2019-08-12

## 2019-08-12 RX ORDER — OXYCODONE HYDROCHLORIDE 5 MG/1
5 TABLET ORAL EVERY 6 HOURS PRN
Qty: 30 TABLET | Refills: 0 | OUTPATIENT
Start: 2019-08-12

## 2019-08-12 NOTE — PROGRESS NOTES
Called patient and explained that it's too soon for a refill of his pain medication. He will be decreased to norco at this point. Rx sent to pharmacy and patient understands that he likely won't be able to fill it until Wednesday.

## 2019-08-16 ENCOUNTER — OFFICE VISIT (OUTPATIENT)
Dept: ORTHOPEDICS | Facility: CLINIC | Age: 44
End: 2019-08-16
Payer: COMMERCIAL

## 2019-08-16 VITALS
BODY MASS INDEX: 27.28 KG/M2 | TEMPERATURE: 97 F | DIASTOLIC BLOOD PRESSURE: 76 MMHG | SYSTOLIC BLOOD PRESSURE: 110 MMHG | HEIGHT: 68 IN | HEART RATE: 71 BPM | WEIGHT: 180 LBS

## 2019-08-16 DIAGNOSIS — Z96.642 S/P HIP REPLACEMENT, LEFT: Primary | ICD-10-CM

## 2019-08-16 PROCEDURE — 99024 POSTOP FOLLOW-UP VISIT: CPT | Mod: S$GLB,,, | Performed by: PHYSICIAN ASSISTANT

## 2019-08-16 PROCEDURE — 99999 PR PBB SHADOW E&M-EST. PATIENT-LVL III: ICD-10-PCS | Mod: PBBFAC,,, | Performed by: PHYSICIAN ASSISTANT

## 2019-08-16 PROCEDURE — 99024 PR POST-OP FOLLOW-UP VISIT: ICD-10-PCS | Mod: S$GLB,,, | Performed by: PHYSICIAN ASSISTANT

## 2019-08-16 PROCEDURE — 99999 PR PBB SHADOW E&M-EST. PATIENT-LVL III: CPT | Mod: PBBFAC,,, | Performed by: PHYSICIAN ASSISTANT

## 2019-08-16 NOTE — PROGRESS NOTES
"Raoul Logan presents for initial post-operative visit following a left total hip arthroplasty performed by Dr. Green on 8/2/2019.  Tolerating pain medication well.     Exam:  /76   Pulse 71   Temp 97.3 °F (36.3 °C)   Ht 5' 8" (1.727 m)   Wt 81.6 kg (180 lb)   BMI 27.37 kg/m²    Patient is ambulating well with walker.   Incision is clean and dry without drainage or erythema.      Initial post-operative radiographs reviewed today revealing satisfactory position of the prosthesis.    A/P  2 weeks s/p left total hip replacement  - Outpatient physical therapy orders placed today for [facility]  - Continue aspirin for 1 month from surgery.  - Pain medication: refill of Norco 5mg just sent on 8/12/19  - Follow up in 4 weeks with Dr. Green and new x-rays. Pt will call clinic immediately with problems/concerns.       "

## 2019-08-26 ENCOUNTER — CLINICAL SUPPORT (OUTPATIENT)
Dept: REHABILITATION | Facility: HOSPITAL | Age: 44
End: 2019-08-26
Attending: PHYSICIAN ASSISTANT
Payer: COMMERCIAL

## 2019-08-26 DIAGNOSIS — M25.552 LEFT HIP PAIN: Primary | ICD-10-CM

## 2019-08-26 DIAGNOSIS — M25.652 DECREASED RANGE OF LEFT HIP MOVEMENT: ICD-10-CM

## 2019-08-26 PROCEDURE — 97161 PT EVAL LOW COMPLEX 20 MIN: CPT | Mod: PN

## 2019-08-26 PROCEDURE — 97116 GAIT TRAINING THERAPY: CPT | Mod: PN

## 2019-08-26 PROCEDURE — 97110 THERAPEUTIC EXERCISES: CPT | Mod: PN

## 2019-08-26 NOTE — PLAN OF CARE
[facility]   PATIENT EVALUATION    Name: Raoul Logan  Clinic Number: [id number]    Diagnosis:   Encounter Diagnoses   Name Primary?    Left hip pain Yes    Decreased range of left hip movement      Physician: Grant Rocha PA*  Treatment Orders: PT Eval and Treat    History     No past medical history on file.  Current Outpatient Medications   Medication Sig    acetaminophen (TYLENOL) 650 MG TbSR Take 1 tablet (650 mg total) by mouth every 6 (six) hours as needed (pain).    aspirin (ECOTRIN) 81 MG EC tablet Take 1 tablet (81 mg total) by mouth 2 (two) times daily.    celecoxib (CELEBREX) 200 MG capsule Take 1 capsule (200 mg total) by mouth once daily.    docusate sodium (COLACE) 100 MG capsule Take 1 capsule (100 mg total) by mouth 2 (two) times daily.     No current facility-administered medications for this visit.      Review of patient's allergies indicates:   Allergen Reactions    Pcn [penicillins] Anaphylaxis       Precautions: L THR 8/2/19, anterior approach    Evaluation Date: 8/26/19  Start Time: 915  Stop Time: 958  Visit # authorized: 1/20  Authorization period: 8/26/19-12/31/19  Plan of care expiration: 10/7/19  MD referral: Grant Rocha PA-C    Hx of present illness: Pt reports L hip pain starting one year ago that resulted in L anterior approach THR on 8/2/19. He has received home health therapy since surgery.      Subjective     Raoul Logan states he was independent prior to surgery. 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 Patient lives with his wife and has no stairs in his home. Patient able to recite precautions and reports compliance. Pt reports he uses his RW for outdoor ambulation and his SPC for ambulation in the home. He is still using a grabber to don/doff L shoe/sock.    Occupation:  mostly standing (5-8 hours)  Exercise:  would like to return to swimming, kayaking, light jogging    Pain:  Location: anterior L hip and lateral L thigh  Description: sharp and cramping  Activities Which Increase Pain: walking, standing  Activities Which Decrease Pain: hydrocodone (2x/day), celebrex, baby aspirin  Pain Scale: 3/10 now 6/10 at worst 1/10 at best    Physical Therapy Goals: get back to work     Objective     Observation: patient ambulated into clinic with RW    Squat: to 70 degrees knee flexion, decreased weight shift to LLE, LOB at bottom requiring min A to recover  SLS: > 30 seconds R   12 seconds L    Posture: decreased lumbar lordosis    Gait:    RW: increased forward trunk lean, decreased weight shift LLE and step length RLE   SPC: increased R lateral trunk lean, cane in R hand, decreased weight shift LLE and step length RLE    Active/Passive Hip ROM: (measured in degrees)    RLE LLE   Flexion 90/95 90/95   Abduction WNL NT secondary to sx      Extension WNL NT secondary to sx   ER WNL NT secondary to sx      IR WNL NT secondary to sx        Sensation: decreased around incision    Lower Extremity Strength: (graded 1-5 out of 5)      RLE LLE   Hip flexion: 5/5 4/5   Hip ER 5/5 NT secondary to sx   Hip IR 5/5 NT secondary to sx   Knee extension: 5/5 4/5   Knee Flexion 5/5 4/5   Ankle dorsiflexion: 5/5 5/5   Hip abduction 5/5 4/5   Hip adduction NT NT   Hip extension: 5/5 NT   Ankle plantarflexion: 5/5 5/5     Special Tests: ((+): pos.; (-): neg.) NT due to surgical hx    Palpation: increased tightness surrounding scar    Flexibility: HS Length/lacking 25 degrees from 90/90 position    PT reviewed FOTO scores for Raoul Logan on 8/26/19  FOTO score: 63% limited    Functional Limitations Reports - G Codes  Category: mobility  Tool: FOTO      Current ():  CL  Goal (): CJ      TREATMENT:  Therapeutic exercise: Raoul received therapeutic exercises to develop strength and endurance, flexibility for 10 minutes including:  "    DKTC with SB x20  S/l hip abduction 2x10  Squats 2x10  Calf raises x20  Supine HSS with strap 3x30"    Gait training x8 minutes: With SPC on level surface x200ft, ramp, and curb    Pt. Education: Pt. educational information was provided, including: role of PT, goals for PT, scheduling - pt verbalized understanding. Instructed pt. regarding: proper form/technique with all exercises. Pt demo good understanding of the education provided. Raoul demonstrated good return demonstration of activities.    · Pt has no cultural, educational or language barriers to learning provided.    Assessment   Patient is a 44 y.o. male referred to outpatient physical therapy who presents with a PT diagnosis of L hip pain s/l hip replacement demonstrating joint dysfunction and functional limitation as described below. Level of complexity is low;  based on patient's past medical history including the below co-morbidities and personal factors; functional limitations, and clinical presentation directly impacting his/her plan of care. Pt demonstrates excellent rehab potential. Pt will benefit from physcial therapy services in order to maximize pain free functional mobility. The following goals were discussed with the patient and patient is in agreement with them as to be addressed in the treatment plan. Pt was given a HEP consisting of supine HSS, sl hip abduction, calf raises, and squats. Pt verbally understood the instructions as they were given and demonstrated proper form and technique during therapy. Pt was advised to perform these exercises free of pain, and to stop performing them if pain occurs.       History  Co-morbidities and personal factors that may impact the plan of care Examination  Body Structures and Functions, activity limitations and participation restrictions that may impact the plan of care Clinical Presentation   Decision Making/ Complexity Score   Co-morbidities:   none            Personal Factors:   Tobacco use, " occupation requiring long hours on feet Body Regions: L hip    Body Systems: musculoskeletal      Activity limitations: standing, walking      Participation Restrictions: work, exercise       stable   low       Medical necessity is demonstrated by the following IMPAIRMENTS/PROBLEM LIST:   1) Pain limiting function   2) Gait abnormality   3) Gluteus medius weakness   4) Decreased joint mobility   5) Decreased flexibility   6) Decreased ROM    7) Decreased proprioception/balance   8) Lack of HEP      GOALS:   Short Term Goals:  3 weeks  1. Patient will be proficient and compliant with HEP.  2. Decrease pain at worst to no greater than 4/10.  3. Patient will be able to squat with no LOB in order to improve functional mobility and safety in the work place.    Long Term Goals: 6 weeks  1. Abolish all L hip pain  2. Patient will be </= 35% limited in mobility according to FOTO.  3. Patient will be able to return to work.  4. Patient will demonstrate 5/5 LLE MMT in order to improve functional mobility.    Plan     Pt will be treated by physical therapy 2 times a week for 6 weeks for pt education, HEP, therapeutic exercises, neuromuscular re-education, joint/soft tissue mobilizations, modalities prn to achieve established goals. Raoul may at times be seen by a PTA as part of the Rehab Team.     I certify the need for these services furnished under this plan of treatment and while under my care  ._Grant Rocha PA-C_ Physician/Referring Practitioner  Date of Signature: 8/26/19    Bella Garcia, PT, DPT

## 2019-08-26 NOTE — PROGRESS NOTES
[facility]   PATIENT EVALUATION    Name: Raoul Logan  Clinic Number: [id number]    Diagnosis:   Encounter Diagnoses   Name Primary?    Left hip pain Yes    Decreased range of left hip movement      Physician: Grant Rocha PA*  Treatment Orders: PT Eval and Treat    History     No past medical history on file.  Current Outpatient Medications   Medication Sig    acetaminophen (TYLENOL) 650 MG TbSR Take 1 tablet (650 mg total) by mouth every 6 (six) hours as needed (pain).    aspirin (ECOTRIN) 81 MG EC tablet Take 1 tablet (81 mg total) by mouth 2 (two) times daily.    celecoxib (CELEBREX) 200 MG capsule Take 1 capsule (200 mg total) by mouth once daily.    docusate sodium (COLACE) 100 MG capsule Take 1 capsule (100 mg total) by mouth 2 (two) times daily.     No current facility-administered medications for this visit.      Review of patient's allergies indicates:   Allergen Reactions    Pcn [penicillins] Anaphylaxis       Precautions: L THR 8/2/19, anterior approach    Evaluation Date: 8/26/19  Start Time: 915  Stop Time: 958  Visit # authorized: 1/20  Authorization period: 8/26/19-12/31/19  Plan of care expiration: 10/7/19  MD referral: Grant Rocha PA-C    Hx of present illness: Pt reports L hip pain starting one year ago that resulted in L anterior approach THR on 8/2/19. He has received home health therapy since surgery.      Subjective     Raoul Logan states he was independent prior to surgery. 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 Patient lives with his wife and has no stairs in his home. Patient able to recite precautions and reports compliance. Pt reports he uses his RW for outdoor ambulation and his SPC for ambulation in the home. He is still using a grabber to don/doff L shoe/sock.    Occupation:  mostly standing (5-8 hours)  Exercise:  would like to return to swimming, kayaking, light jogging    Pain:  Location: anterior L hip and lateral L thigh  Description: sharp and cramping  Activities Which Increase Pain: walking, standing  Activities Which Decrease Pain: hydrocodone (2x/day), celebrex, baby aspirin  Pain Scale: 3/10 now 6/10 at worst 1/10 at best    Physical Therapy Goals: get back to work     Objective     Observation: patient ambulated into clinic with RW    Squat: to 70 degrees knee flexion, decreased weight shift to LLE, LOB at bottom requiring min A to recover  SLS: > 30 seconds R   12 seconds L    Posture: decreased lumbar lordosis    Gait:    RW: increased forward trunk lean, decreased weight shift LLE and step length RLE   SPC: increased R lateral trunk lean, cane in R hand, decreased weight shift LLE and step length RLE    Active/Passive Hip ROM: (measured in degrees)    RLE LLE   Flexion 90/95 90/95   Abduction WNL NT secondary to sx      Extension WNL NT secondary to sx   ER WNL NT secondary to sx      IR WNL NT secondary to sx        Sensation: decreased around incision    Lower Extremity Strength: (graded 1-5 out of 5)      RLE LLE   Hip flexion: 5/5 4/5   Hip ER 5/5 NT secondary to sx   Hip IR 5/5 NT secondary to sx   Knee extension: 5/5 4/5   Knee Flexion 5/5 4/5   Ankle dorsiflexion: 5/5 5/5   Hip abduction 5/5 4/5   Hip adduction NT NT   Hip extension: 5/5 NT   Ankle plantarflexion: 5/5 5/5     Special Tests: ((+): pos.; (-): neg.) NT due to surgical hx    Palpation: increased tightness surrounding scar    Flexibility: HS Length/lacking 25 degrees from 90/90 position    PT reviewed FOTO scores for Raoul Logan on 8/26/19  FOTO score: 63% limited    Functional Limitations Reports - G Codes  Category: mobility  Tool: FOTO      Current ():  CL  Goal (): CJ      TREATMENT:  Therapeutic exercise: Raoul received therapeutic exercises to develop strength and endurance, flexibility for 10 minutes including:  "    DKTC with SB x20  S/l hip abduction 2x10  Squats 2x10  Calf raises x20  Supine HSS with strap 3x30"    Gait training x8 minutes: With SPC on level surface x200ft, ramp, and curb    Pt. Education: Pt. educational information was provided, including: role of PT, goals for PT, scheduling - pt verbalized understanding. Instructed pt. regarding: proper form/technique with all exercises. Pt demo good understanding of the education provided. Raoul demonstrated good return demonstration of activities.    · Pt has no cultural, educational or language barriers to learning provided.    Assessment   Patient is a 44 y.o. male referred to outpatient physical therapy who presents with a PT diagnosis of L hip pain s/l hip replacement demonstrating joint dysfunction and functional limitation as described below. Level of complexity is low;  based on patient's past medical history including the below co-morbidities and personal factors; functional limitations, and clinical presentation directly impacting his/her plan of care. Pt demonstrates excellent rehab potential. Pt will benefit from physcial therapy services in order to maximize pain free functional mobility. The following goals were discussed with the patient and patient is in agreement with them as to be addressed in the treatment plan. Pt was given a HEP consisting of supine HSS, sl hip abduction, calf raises, and squats. Pt verbally understood the instructions as they were given and demonstrated proper form and technique during therapy. Pt was advised to perform these exercises free of pain, and to stop performing them if pain occurs.       History  Co-morbidities and personal factors that may impact the plan of care Examination  Body Structures and Functions, activity limitations and participation restrictions that may impact the plan of care Clinical Presentation   Decision Making/ Complexity Score   Co-morbidities:   none            Personal Factors:   Tobacco use, " occupation requiring long hours on feet Body Regions: L hip    Body Systems: musculoskeletal      Activity limitations: standing, walking      Participation Restrictions: work, exercise       stable   low       Medical necessity is demonstrated by the following IMPAIRMENTS/PROBLEM LIST:   1) Pain limiting function   2) Gait abnormality   3) Gluteus medius weakness   4) Decreased joint mobility   5) Decreased flexibility   6) Decreased ROM    7) Decreased proprioception/balance   8) Lack of HEP      GOALS:   Short Term Goals:  3 weeks  1. Patient will be proficient and compliant with HEP.  2. Decrease pain at worst to no greater than 4/10.  3. Patient will be able to squat with no LOB in order to improve functional mobility and safety in the work place.    Long Term Goals: 6 weeks  1. Abolish all L hip pain  2. Patient will be </= 35% limited in mobility according to FOTO.  3. Patient will be able to return to work.  4. Patient will demonstrate 5/5 LLE MMT in order to improve functional mobility.    Plan     Pt will be treated by physical therapy 2 times a week for 6 weeks for pt education, HEP, therapeutic exercises, neuromuscular re-education, joint/soft tissue mobilizations, modalities prn to achieve established goals. Raoul may at times be seen by a PTA as part of the Rehab Team.     I certify the need for these services furnished under this plan of treatment and while under my care  .______________________________ Physician/Referring Practitioner  Date of Signature    Bella Garcia, PT, DPT

## 2019-08-28 NOTE — PROGRESS NOTES
"                                                  Physical Therapy Daily Note     Date: 08/29/2019  Name: Raoul Logan  Clinic Number: [id number]  Diagnosis:   Encounter Diagnoses   Name Primary?    Decreased range of left hip movement     Left hip pain Yes     Physician: Grant Rocha PA*    Precautions: L THR 8/2/19, anterior approach    Evaluation Date: 8/26/19  Start Time: 1400  Stop Time: 1455  Visit # authorized: 2/20  Authorization period: 8/26/19-12/31/19  Plan of care expiration: 10/7/19  MD referral: Grant Rocha PA-C    Hx of present illness: Pt reports L hip pain starting one year ago that resulted in L anterior approach THR on 8/2/19. He has received home health therapy since surgery.    Subjective     Pt reports he was hurting yesterday and some today. He is getting more comfortable walking with his cane. He reports 2/10 L hip pain pre-tx.    Objective     Patient ambulated into clinic with SPC in R hand    Patient received individual therapy to increase strength, endurance, ROM, flexibility and posture with activities as follows:     Raoul received therapeutic exercises to develop strength, endurance, ROM, flexibility and posture for 55 minutes including:     TM .7mph x5 min  DKTC with SB x20  S/l hip abduction 3x10  Bridges 3x10  HSS L 3x30" supine with strap  Adduction squeeze 20x5"  Squats 3x10 at // bars  Calf raises 3x10  Calf st on slant board x1 min knee straight  Supine HSS with strap 3x30"  Standing marches x20 ea  Step ups on foam x20  Step ups 6" step 2x10 6"  Step downs 4" step 2x10  Steamboats 2x10  Dynamic balance with trampoline throws, SLS L x2 min  Matrix knee flexion 35# 2x10  Matrix knee extension 25# 3x10    Pt received CP to L hip for 10 minutes post tx    Written Home Exercises Provided: no new exercises provided this session  Pt demo good understanding of the education provided. Raoul demonstrated good return demonstration of activities.     Education " provided: FELIX Silveira verbalized good understanding of education provided.   No spiritual or educational barriers to learning identified.    Assessment     Patient tolerated tx well. Added various strength and stability exercises to improve function. Patient with no reports of increased pain throughout treatment. Patient will continue to benefit from skilled PT in order to decrease pain, increase strength/ROM, improve gait   GOALS:   Short Term Goals:  3 weeks  1. Patient will be proficient and compliant with HEP.  2. Decrease pain at worst to no greater than 4/10.  3. Patient will be able to squat with no LOB in order to improve functional mobility and safety in the work place.    Long Term Goals: 6 weeks  1. Abolish all L hip pain  2. Patient will be </= 35% limited in mobility according to FOTO.  3. Patient will be able to return to work.  4. Patient will demonstrate 5/5 LLE MMT in order to improve functional mobility.         Plan   Continue with established Plan of Care towards PT goals.      Therapist: Bella Garcia, PT, DPT

## 2019-08-29 ENCOUNTER — CLINICAL SUPPORT (OUTPATIENT)
Dept: REHABILITATION | Facility: HOSPITAL | Age: 44
End: 2019-08-29
Attending: PHYSICIAN ASSISTANT
Payer: COMMERCIAL

## 2019-08-29 DIAGNOSIS — M25.552 LEFT HIP PAIN: Primary | ICD-10-CM

## 2019-08-29 DIAGNOSIS — M25.652 DECREASED RANGE OF LEFT HIP MOVEMENT: ICD-10-CM

## 2019-08-29 PROCEDURE — 97110 THERAPEUTIC EXERCISES: CPT | Mod: PN

## 2019-08-30 DIAGNOSIS — M25.552 LEFT HIP PAIN: Primary | ICD-10-CM

## 2019-09-03 DIAGNOSIS — Z96.642 S/P HIP REPLACEMENT, LEFT: Primary | ICD-10-CM

## 2019-09-03 RX ORDER — CELECOXIB 100 MG/1
100 CAPSULE ORAL 2 TIMES DAILY
Qty: 30 CAPSULE | Refills: 0 | Status: SHIPPED | OUTPATIENT
Start: 2019-09-03 | End: 2019-09-18

## 2019-09-04 ENCOUNTER — CLINICAL SUPPORT (OUTPATIENT)
Dept: REHABILITATION | Facility: HOSPITAL | Age: 44
End: 2019-09-04
Attending: PHYSICIAN ASSISTANT
Payer: COMMERCIAL

## 2019-09-04 DIAGNOSIS — M25.652 DECREASED RANGE OF LEFT HIP MOVEMENT: ICD-10-CM

## 2019-09-04 DIAGNOSIS — M25.552 LEFT HIP PAIN: Primary | ICD-10-CM

## 2019-09-04 PROCEDURE — 97110 THERAPEUTIC EXERCISES: CPT | Mod: PN

## 2019-09-04 NOTE — PROGRESS NOTES
"                                                  Physical Therapy Daily Note     Date: 09/04/2019  Name: Raoul Logan  Clinic Number: [id number]  Diagnosis:   Encounter Diagnoses   Name Primary?    Decreased range of left hip movement     Left hip pain Yes     Physician: Grnat Rocha PA*    Precautions: L THR 8/2/19, anterior approach    Evaluation Date: 8/26/19  Start Time: 1405  Stop Time: 1500  Visit # authorized: 3/20  Authorization period: 8/26/19-12/31/19  Plan of care expiration: 10/7/19  MD referral: Grant Rocha PA-C    Hx of present illness: Pt reports L hip pain starting one year ago that resulted in L anterior approach THR on 8/2/19. He has received home health therapy since surgery.    Subjective     Pt reports he was hurting yesterday and some today. He is getting more comfortable walking with his cane. He reports 2/10 L hip pain pre-tx. Patient asked about being able to rub lotion/vitamin E cream over incision.    Objective     Patient ambulated into clinic with SPC in R hand; incision site clean but still scabbed over 1/2    Patient received individual therapy to increase strength, endurance, ROM, flexibility and posture with activities as follows:     Raoul received therapeutic exercises to develop strength, endurance, ROM, flexibility and posture for 55 minutes including:     TM .7mph x5 min (walking around clinic today no AD x3 laps)  NuStep level 3 x6 min  DKTC with SB x20  S/l hip abduction 3x10  Bridges 3x5 on SB  HSS L 3x30" supine with strap  Adduction squeeze 20x5"  Squats 3x10 at // bars  Calf raises 3x10  Calf st on slant board x1 min knee straight  Standing marches x20 ea  Step ups on foam x20  Step ups 6" step 2x10 6"  Step downs 4" step 2x10  Steamboats 3x10  Dynamic balance with trampoline throws, SLS L x2 min  Matrix knee flexion 35# 3x10  Matrix knee extension 25# 3x10    Pt received CP to L hip for 00 minutes post tx-declined today    Written Home Exercises " Provided: no new exercises provided this session  Pt demo good understanding of the education provided. Raoul demonstrated good return demonstration of activities.     Education provided: FELIX Silveira verbalized good understanding of education provided.   No spiritual or educational barriers to learning identified.    Assessment     Patient tolerated tx well. Patient encouraged to hold off on rubbing lotion over scabbed area to avoid peeling off the scabs but he is welcome to use it around the incision and massage all closed areas. Pt with improved gait mechanics, strength, and mobility noted throughout exercises today. Requires verbal cues during step ups and squats. Patient will continue to benefit from skilled PT in order to decrease pain, increase strength/ROM, improve gait mechanics, and improve functional mobility.      GOALS:   Short Term Goals:  3 weeks  1. Patient will be proficient and compliant with HEP.  2. Decrease pain at worst to no greater than 4/10.  3. Patient will be able to squat with no LOB in order to improve functional mobility and safety in the work place.    Long Term Goals: 6 weeks  1. Abolish all L hip pain  2. Patient will be </= 35% limited in mobility according to FOTO.  3. Patient will be able to return to work.  4. Patient will demonstrate 5/5 LLE MMT in order to improve functional mobility.         Plan   Continue with established Plan of Care towards PT goals.      Therapist: Bella Garcia, PT, DPT

## 2019-09-06 ENCOUNTER — CLINICAL SUPPORT (OUTPATIENT)
Dept: REHABILITATION | Facility: HOSPITAL | Age: 44
End: 2019-09-06
Attending: PHYSICIAN ASSISTANT
Payer: COMMERCIAL

## 2019-09-06 DIAGNOSIS — M25.652 DECREASED RANGE OF LEFT HIP MOVEMENT: ICD-10-CM

## 2019-09-06 DIAGNOSIS — M25.552 LEFT HIP PAIN: Primary | ICD-10-CM

## 2019-09-06 PROCEDURE — 97110 THERAPEUTIC EXERCISES: CPT | Mod: PN

## 2019-09-06 NOTE — PROGRESS NOTES
"                                                  Physical Therapy Daily Note     Date: 09/06/2019  Name: Raoul Logan  Clinic Number: [id number]  Diagnosis:   Encounter Diagnoses   Name Primary?    Decreased range of left hip movement     Left hip pain Yes     Physician: Grant Rocha PA*    Precautions: L THR 8/2/19, anterior approach    Evaluation Date: 8/26/19  Start Time: 1400  Stop Time: 1458  Visit # authorized: 4/20  Authorization period: 8/26/19-12/31/19  Plan of care expiration: 10/7/19  MD referral: Grant Rocha PA-C    Hx of present illness: Pt reports L hip pain starting one year ago that resulted in L anterior approach THR on 8/2/19. He has received home health therapy since surgery.    Subjective     Pt reports he did a lot yesterday so he is in a bit of pain 1-2/10. Reports he is going to the beach this weekend but he will have rented chairs and people to hold him while walking in the sand.    Objective     Patient ambulated into clinic with no Ad    Patient received individual therapy to increase strength, endurance, ROM, flexibility and posture with activities as follows:     Raoul received therapeutic exercises to develop strength, endurance, ROM, flexibility and posture for 28 minutes including:     TM 1.4mph x7 min  NuStep level 3 x6 min NP  DKTC with SB x20   S/l hip abduction 3x10  Bridges 3x10  HSS L 3x30" supine with strap  Adduction squeeze 20x5"   Squats 2x10 at // bars  Calf raises 3x10  Calf st on slant board x1 min knee straight and bent  Standing marches x20 ea on foam  Step ups on foam x20 LLE with R knee to chest/balance  Step ups 6" step 2x10 6"  Step downs 4" step 2x10  Steamboats 3x10  Dynamic balance with trampoline throws, SLS L x2 min  NP  Matrix knee flexion 35# 3x10  Matrix knee extension 25# 3x10  Sit to stands from 12" with foam 3x5    Pt received CP to L hip for 00 minutes post tx-declined today    Written Home Exercises Provided: no new exercises " "provided this session  Pt demo good understanding of the education provided. Raoul demonstrated good return demonstration of activities.     Education provided: FELIX Silveira verbalized good understanding of education provided.   No spiritual or educational barriers to learning identified.    Assessment     Patient tolerated tx well. Patient with reports he is going to walk on the beach and hang out on beach chairs over the weekend; PT discussed importance of safety and use of family for support; practiced sit to stands from 12" box with no need for external support, however, patient encouraged to use family for support due to sand. Patient progressing well toward goals and ambulating with no AD. Patient will continue to benefit from skilled PT in order to decrease pain, increase strength/ROM, improve gait mechanics, and improve functional mobility.      GOALS:   Short Term Goals:  3 weeks  1. Patient will be proficient and compliant with HEP.  2. Decrease pain at worst to no greater than 4/10.  3. Patient will be able to squat with no LOB in order to improve functional mobility and safety in the work place.    Long Term Goals: 6 weeks  1. Abolish all L hip pain  2. Patient will be </= 35% limited in mobility according to FOTO.  3. Patient will be able to return to work.  4. Patient will demonstrate 5/5 LLE MMT in order to improve functional mobility.         Plan   Continue with established Plan of Care towards PT goals.      Therapist: Bella Garcia, PT, DPT      "

## 2019-09-12 ENCOUNTER — OFFICE VISIT (OUTPATIENT)
Dept: ORTHOPEDICS | Facility: CLINIC | Age: 44
End: 2019-09-12
Payer: COMMERCIAL

## 2019-09-12 ENCOUNTER — HOSPITAL ENCOUNTER (OUTPATIENT)
Dept: RADIOLOGY | Facility: HOSPITAL | Age: 44
Discharge: HOME | End: 2019-09-12
Attending: ORTHOPAEDIC SURGERY
Payer: COMMERCIAL

## 2019-09-12 VITALS — BODY MASS INDEX: 27.3 KG/M2 | HEIGHT: 68 IN | WEIGHT: 180.13 LBS

## 2019-09-12 DIAGNOSIS — M25.552 LEFT HIP PAIN: ICD-10-CM

## 2019-09-12 DIAGNOSIS — Z96.642 PRESENCE OF LEFT ARTIFICIAL HIP JOINT: Primary | ICD-10-CM

## 2019-09-12 PROCEDURE — 73502 X-RAY EXAM HIP UNI 2-3 VIEWS: CPT | Mod: TC,LT

## 2019-09-12 PROCEDURE — 99024 POSTOP FOLLOW-UP VISIT: CPT | Mod: S$GLB,,, | Performed by: ORTHOPAEDIC SURGERY

## 2019-09-12 PROCEDURE — 73502 XR HIP 2 VIEW LEFT: ICD-10-PCS | Mod: 26,LT,, | Performed by: RADIOLOGY

## 2019-09-12 PROCEDURE — 99999 PR PBB SHADOW E&M-EST. PATIENT-LVL III: ICD-10-PCS | Mod: PBBFAC,,, | Performed by: ORTHOPAEDIC SURGERY

## 2019-09-12 PROCEDURE — 73502 X-RAY EXAM HIP UNI 2-3 VIEWS: CPT | Mod: 26,LT,, | Performed by: RADIOLOGY

## 2019-09-12 PROCEDURE — 99024 PR POST-OP FOLLOW-UP VISIT: ICD-10-PCS | Mod: S$GLB,,, | Performed by: ORTHOPAEDIC SURGERY

## 2019-09-12 PROCEDURE — 99999 PR PBB SHADOW E&M-EST. PATIENT-LVL III: CPT | Mod: PBBFAC,,, | Performed by: ORTHOPAEDIC SURGERY

## 2019-09-12 NOTE — PROGRESS NOTES
Subjective:     HPI:   Raoul Logan is a 44 y.o. male who presents for 6 week follow-up left anterior total hip arthroplasty August 21, 2019    Overall is doing well is no issues with the hip he takes some Norco rarely but basically just some Tylenol and Celebrex no assistive devices he has finished his home therapy he was transition to outpatient therapy by Tony     Objective:   Exam:  Walking well no limp nonantalgic gait incisions well-healed 0-100 hip flexion 40 abduction 30 adduction 70 external 30 internal rotation on significant leg length discrepancy supine      Imaging:  Indication:  Exam status post left total hip arthroplasty  Exam Ordered: Radiographs taken today include an anteroposterior pelvis, an AP and  lateral view of the proximal femur including the hip joint  Details of Examination: Today's exam show a well fixed, well positioned total hip arthroplasty with no evidence of wear, osteolysis, or loosening.  Impression:  Status post left total hip arthroplasty, implant in good position with no abnormality          Assessment:     Presence of left artificial hip joint [Z96.642]       Plan:       Patient is doing very well with the hip replacement at this time.  They will continue routine care of their hip and see me for their routine follow-up.  If there are problems in the interim they will see me back sooner.    Given phase 2 of her exercises, he wants to return to work form in a couple weeks will see him back in 6 weeks for three-month follow-up with an AP pelvis x-ray    No orders of the defined types were placed in this encounter.      History reviewed. No pertinent past medical history.    Past Surgical History:   Procedure Laterality Date    ARTHROPLASTY, HIP, ANTERIOR APPROACH- DEPUY-ACTIS+PINNACLE Left 8/2/2019    Performed by Rakesh Green III, MD at [facility] OR 2ND FLR    COLONOSCOPY N/A 7/1/2019    Performed by Petros Ware MD at [facility] ENDO (4TH FLR)    EGD  (ESOPHAGOGASTRODUODENOSCOPY) N/A 7/1/2019    Performed [facility] (4TH FLR)    KNEE SURGERY Left 1991       Family History   Problem Relation Age of Onset    Cancer Mother         breast    No Known Problems Father     Colon cancer Neg Hx     Esophageal cancer Neg Hx     Stomach cancer Neg Hx     Rectal cancer Neg Hx     Crohn's disease Neg Hx     Irritable bowel syndrome Neg Hx        Social History     Socioeconomic History    Marital status: Single     Spouse name: Not on file    Number of children: Not on file    Years of education: Not on file    Highest education level: Not on file   Occupational History    Not on file   Social Needs    Financial resource strain: Not on file    Food insecurity:     Worry: Not on file     Inability: Not on file    Transportation needs:     Medical: Not on file     Non-medical: Not on file   Tobacco Use    Smoking status: Former Smoker     Packs/day: 0.25    Smokeless tobacco: Never Used   Substance and Sexual Activity    Alcohol use: Yes     Frequency: Never     Comment: 2 times a week     Drug use: Yes     Types: Marijuana    Sexual activity: Yes     Partners: Female   Lifestyle    Physical activity:     Days per week: Not on file     Minutes per session: Not on file    Stress: Not on file   Relationships    Social connections:     Talks on phone: Not on file     Gets together: Not on file     Attends Yazidism service: Not on file     Active member of club or organization: Not on file     Attends meetings of clubs or organizations: Not on file     Relationship status: Not on file   Other Topics Concern    Not on file   Social History Narrative    Not on file

## 2019-10-21 DIAGNOSIS — R10.2 PAIN IN PELVIS: Primary | ICD-10-CM

## 2019-10-24 ENCOUNTER — HOSPITAL ENCOUNTER (OUTPATIENT)
Dept: RADIOLOGY | Facility: HOSPITAL | Age: 44
Discharge: HOME | End: 2019-10-24
Attending: ORTHOPAEDIC SURGERY
Payer: COMMERCIAL

## 2019-10-24 ENCOUNTER — OFFICE VISIT (OUTPATIENT)
Dept: ORTHOPEDICS | Facility: CLINIC | Age: 44
End: 2019-10-24
Payer: COMMERCIAL

## 2019-10-24 VITALS — BODY MASS INDEX: 26.21 KG/M2 | HEIGHT: 68 IN | WEIGHT: 172.94 LBS

## 2019-10-24 DIAGNOSIS — R10.2 PAIN IN PELVIS: ICD-10-CM

## 2019-10-24 DIAGNOSIS — Z96.642 PRESENCE OF LEFT ARTIFICIAL HIP JOINT: Primary | ICD-10-CM

## 2019-10-24 PROCEDURE — 99999 PR PBB SHADOW E&M-EST. PATIENT-LVL III: CPT | Mod: PBBFAC,,, | Performed by: ORTHOPAEDIC SURGERY

## 2019-10-24 PROCEDURE — 72170 X-RAY EXAM OF PELVIS: CPT | Mod: 26,,, | Performed by: RADIOLOGY

## 2019-10-24 PROCEDURE — 99999 PR PBB SHADOW E&M-EST. PATIENT-LVL III: ICD-10-PCS | Mod: PBBFAC,,, | Performed by: ORTHOPAEDIC SURGERY

## 2019-10-24 PROCEDURE — 99024 PR POST-OP FOLLOW-UP VISIT: ICD-10-PCS | Mod: S$GLB,,, | Performed by: ORTHOPAEDIC SURGERY

## 2019-10-24 PROCEDURE — 72170 X-RAY EXAM OF PELVIS: CPT | Mod: TC

## 2019-10-24 PROCEDURE — 72170 XR PELVIS ROUTINE AP: ICD-10-PCS | Mod: 26,,, | Performed by: RADIOLOGY

## 2019-10-24 PROCEDURE — 99024 POSTOP FOLLOW-UP VISIT: CPT | Mod: S$GLB,,, | Performed by: ORTHOPAEDIC SURGERY

## 2019-10-24 NOTE — PROGRESS NOTES
Subjective:     HPI:   Raoul Logan is a 44 y.o. male who presents for three-month follow-up left anterior total hip replacement August 2, 2019    Overall he says he is doing great he started fixate about his hip occasionally uses some Tylenol after a long shift about once a week he is working full-time in no assistive devices the lateral femoral cutaneous nerve symptoms have gone away     Objective:   Exam:  No limp nonantalgic gait negative Trendelenburg his incision is well healed 0-120 flexion 40 abduction 30 adduction 60 external 40 internal rotation no limb length discrepancy supine      Imaging:  Indication:  Exam status post left total hip arthroplasty  Exam Ordered: Radiographs taken today include an anteroposterior pelvis,   Details of Examination: Today's exam show a well fixed, well positioned total hip arthroplasty with no evidence of wear, osteolysis, or loosening.  Impression:  Status post left total hip arthroplasty, implant in good position with no abnormality        Assessment:     Presence of left artificial hip joint [Z96.642]  Osteonecrosis     Plan:       Patient is doing very well with the hip replacement at this time.  They will continue routine care of their hip and see me for their routine follow-up.  If there are problems in the interim they will see me back sooner.  Nine month follow-up for 1 year x-rays    No orders of the defined types were placed in this encounter.      History reviewed. No pertinent past medical history.    Past Surgical History:   Procedure Laterality Date    ARTHROPLASTY OF HIP BY ANTERIOR APPROACH Left 8/2/2019    Procedure: ARTHROPLASTY, HIP, ANTERIOR APPROACH- DEPUY-ACTIS+PINNACLE;  Surgeon: Rakesh Green III, MD;  Location: [facility] OR [address] [place] [address];  Service: Orthopedics;  Laterality: Left;    COLONOSCOPY N/A 7/1/2019    Procedure: COLONOSCOPY;  Surgeon: Petros Ware MD;  Location: [facility] (4TH FLR);  Service: Endoscopy;  Laterality: N/A;     ESOPHAGOGASTRODUODENOSCOPY N/A 7/1/2019    Procedure: EGD (ESOPHAGOGASTRODUODENOSCOPY);  Surgeon: Petros Ware MD;  Location: [address];  Service: Endoscopy;  Laterality: N/A;    KNEE SURGERY Left 1991       Family History   Problem Relation Age of Onset    Cancer Mother         breast    No Known Problems Father     Colon cancer Neg Hx     Esophageal cancer Neg Hx     Stomach cancer Neg Hx     Rectal cancer Neg Hx     Crohn's disease Neg Hx     Irritable bowel syndrome Neg Hx        Social History     Socioeconomic History    Marital status: Single     Spouse name: Not on file    Number of children: Not on file    Years of education: Not on file    Highest education level: Not on file   Occupational History    Not on file   Social Needs    Financial resource strain: Not on file    Food insecurity:     Worry: Not on file     Inability: Not on file    Transportation needs:     Medical: Not on file     Non-medical: Not on file   Tobacco Use    Smoking status: Former Smoker     Packs/day: 0.25    Smokeless tobacco: Never Used   Substance and Sexual Activity    Alcohol use: Yes     Frequency: Never     Comment: 2 times a week     Drug use: Yes     Types: Marijuana    Sexual activity: Yes     Partners: Female   Lifestyle    Physical activity:     Days per week: Not on file     Minutes per session: Not on file    Stress: Not on file   Relationships    Social connections:     Talks on phone: Not on file     Gets together: Not on file     Attends Jewish service: Not on file     Active member of club or organization: Not on file     Attends meetings of clubs or organizations: Not on file     Relationship status: Not on file   Other Topics Concern    Not on file   Social History Narrative    Not on file

## 2020-04-23 ENCOUNTER — TELEPHONE (OUTPATIENT)
Dept: ORTHOPEDICS | Facility: CLINIC | Age: 45
End: 2020-04-23

## 2020-04-23 NOTE — TELEPHONE ENCOUNTER
I called the patient regarding his May 1 appointment. The patient states that he is doing well with a few aches and pains every once in a while. The patient agrees that we can move the appointment closer to Aug 2 for his 1yr follow-up appointment. The patient verbalized understanding and has no further questions.

## 2020-07-23 DIAGNOSIS — Z96.649 STATUS POST HIP REPLACEMENT, UNSPECIFIED LATERALITY: Primary | ICD-10-CM

## 2020-08-05 ENCOUNTER — PATIENT OUTREACH (OUTPATIENT)
Dept: ADMINISTRATIVE | Facility: OTHER | Age: 45
End: 2020-08-05

## 2020-08-06 ENCOUNTER — HOSPITAL ENCOUNTER (OUTPATIENT)
Dept: RADIOLOGY | Facility: HOSPITAL | Age: 45
Discharge: HOME | End: 2020-08-06
Attending: ORTHOPAEDIC SURGERY
Payer: COMMERCIAL

## 2020-08-06 ENCOUNTER — OFFICE VISIT (OUTPATIENT)
Dept: ORTHOPEDICS | Facility: CLINIC | Age: 45
End: 2020-08-06
Payer: COMMERCIAL

## 2020-08-06 VITALS — HEIGHT: 68 IN | WEIGHT: 172.81 LBS | BODY MASS INDEX: 26.19 KG/M2

## 2020-08-06 DIAGNOSIS — Z96.649 STATUS POST HIP REPLACEMENT, UNSPECIFIED LATERALITY: ICD-10-CM

## 2020-08-06 DIAGNOSIS — Z96.642 PRESENCE OF LEFT ARTIFICIAL HIP JOINT: Primary | ICD-10-CM

## 2020-08-06 PROCEDURE — 3008F PR BODY MASS INDEX (BMI) DOCUMENTED: ICD-10-PCS | Mod: CPTII,S$GLB,, | Performed by: ORTHOPAEDIC SURGERY

## 2020-08-06 PROCEDURE — 99213 OFFICE O/P EST LOW 20 MIN: CPT | Mod: S$GLB,,, | Performed by: ORTHOPAEDIC SURGERY

## 2020-08-06 PROCEDURE — 99999 PR PBB SHADOW E&M-EST. PATIENT-LVL III: CPT | Mod: PBBFAC,,, | Performed by: ORTHOPAEDIC SURGERY

## 2020-08-06 PROCEDURE — 99999 PR PBB SHADOW E&M-EST. PATIENT-LVL III: ICD-10-PCS | Mod: PBBFAC,,, | Performed by: ORTHOPAEDIC SURGERY

## 2020-08-06 PROCEDURE — 73502 X-RAY EXAM HIP UNI 2-3 VIEWS: CPT | Mod: 26,LT,, | Performed by: RADIOLOGY

## 2020-08-06 PROCEDURE — 99213 PR OFFICE/OUTPT VISIT, EST, LEVL III, 20-29 MIN: ICD-10-PCS | Mod: S$GLB,,, | Performed by: ORTHOPAEDIC SURGERY

## 2020-08-06 PROCEDURE — 73502 X-RAY EXAM HIP UNI 2-3 VIEWS: CPT | Mod: TC,LT

## 2020-08-06 PROCEDURE — 73502 XR HIP 2 VIEW LEFT: ICD-10-PCS | Mod: 26,LT,, | Performed by: RADIOLOGY

## 2020-08-06 PROCEDURE — 3008F BODY MASS INDEX DOCD: CPT | Mod: CPTII,S$GLB,, | Performed by: ORTHOPAEDIC SURGERY

## 2020-08-09 NOTE — PROGRESS NOTES
Subjective:     HPI:   Raoul Logan is a 45 y.o. male who presents for 1 year evaluation left anterior approach total hip replacement August 2, 2019    Overall he says he is doing great.  She is to take occasionally takes some Tylenol.  He can walk for over an hour  he is exercising cycling and swimming he occasionally has some intermittent sharp lateral hip pain it is random in nature it is not limiting    He is back at work at the [employer]     Objective:   Body mass index is 26.28 kg/m².  Exam:  No limp nonantalgic gait negative Trendelenburg.  No limb length discrepancy supine.  Incisions well healed.  0-120 hip flexion 60 abduction 20 abduction 70 external 40 internal rotation      Imaging:  Indication:  Exam status post left total hip arthroplasty  Exam Ordered: Radiographs taken today include an anteroposterior pelvis, an AP and  lateral view of the proximal femur including the hip joint  Details of Examination: Today's exam show a well fixed, well positioned total hip arthroplasty with no evidence of wear, osteolysis, or loosening.  Impression:  Status post left total hip arthroplasty, implant in good position with no abnormality        Assessment:     Presence of left artificial hip joint [Z96.642]       Plan:       Patient is doing very well with the hip replacement at this time.  They will continue routine care of their hip and see me for their routine follow-up.  If there are problems in the interim they will see me back sooner.    He would like phase to hip exercises again.  Five year follow-up for surveillance xrays    No orders of the defined types were placed in this encounter.            History reviewed. No pertinent past medical history.    Past Surgical History:   Procedure Laterality Date    ARTHROPLASTY OF HIP BY ANTERIOR APPROACH Left 8/2/2019    Procedure: ARTHROPLASTY, HIP, ANTERIOR APPROACH- DEPUY-ACTIS+PINNACLE;  Surgeon: Rakesh Green III, MD;  Location: [facility] OR [address] [place] [address];   Service: Orthopedics;  Laterality: Left;    COLONOSCOPY N/A 7/1/2019    Procedure: COLONOSCOPY;  Surgeon: Petros Ware MD;  Location: [address]);  Service: Endoscopy;  Laterality: N/A;    ESOPHAGOGASTRODUODENOSCOPY N/A 7/1/2019    Procedure: EGD (ESOPHAGOGASTRODUODENOSCOPY);  Surgeon: Petros Ware MD;  Location: [address]);  Service: Endoscopy;  Laterality: N/A;    KNEE SURGERY Left 1991       Family History   Problem Relation Age of Onset    Cancer Mother         breast    No Known Problems Father     Colon cancer Neg Hx     Esophageal cancer Neg Hx     Stomach cancer Neg Hx     Rectal cancer Neg Hx     Crohn's disease Neg Hx     Irritable bowel syndrome Neg Hx        Social History     Socioeconomic History    Marital status: Single     Spouse name: Not on file    Number of children: Not on file    Years of education: Not on file    Highest education level: Not on file   Occupational History    Not on file   Social Needs    Financial resource strain: Not on file    Food insecurity     Worry: Not on file     Inability: Not on file    Transportation needs     Medical: Not on file     Non-medical: Not on file   Tobacco Use    Smoking status: Former Smoker     Packs/day: 0.25    Smokeless tobacco: Never Used   Substance and Sexual Activity    Alcohol use: Yes     Frequency: Never     Comment: 2 times a week     Drug use: Yes     Types: Marijuana    Sexual activity: Yes     Partners: Female   Lifestyle    Physical activity     Days per week: Not on file     Minutes per session: Not on file    Stress: Not on file   Relationships    Social connections     Talks on phone: Not on file     Gets together: Not on file     Attends Mormon service: Not on file     Active member of club or organization: Not on file     Attends meetings of clubs or organizations: Not on file     Relationship status: Not on file   Other Topics Concern    Not on file   Social History Narrative     Not on file

## 2020-08-14 DIAGNOSIS — Z11.59 NEED FOR HEPATITIS C SCREENING TEST: ICD-10-CM

## 2020-10-05 ENCOUNTER — PATIENT MESSAGE (OUTPATIENT)
Dept: ADMINISTRATIVE | Facility: HOSPITAL | Age: 45
End: 2020-10-05

## 2021-01-04 ENCOUNTER — PATIENT MESSAGE (OUTPATIENT)
Dept: ADMINISTRATIVE | Facility: HOSPITAL | Age: 46
End: 2021-01-04

## 2021-03-09 ENCOUNTER — OFFICE VISIT (OUTPATIENT)
Dept: FAMILY MEDICINE | Facility: CLINIC | Age: 46
End: 2021-03-09
Payer: COMMERCIAL

## 2021-03-09 ENCOUNTER — HOSPITAL ENCOUNTER (EMERGENCY)
Facility: HOSPITAL | Age: 46
Discharge: HOME | End: 2021-03-09
Attending: EMERGENCY MEDICINE
Payer: COMMERCIAL

## 2021-03-09 VITALS
WEIGHT: 171.06 LBS | TEMPERATURE: 97 F | OXYGEN SATURATION: 98 % | BODY MASS INDEX: 25.92 KG/M2 | HEART RATE: 62 BPM | DIASTOLIC BLOOD PRESSURE: 80 MMHG | HEIGHT: 68 IN | RESPIRATION RATE: 19 BRPM | SYSTOLIC BLOOD PRESSURE: 128 MMHG

## 2021-03-09 VITALS
BODY MASS INDEX: 25.33 KG/M2 | TEMPERATURE: 98 F | DIASTOLIC BLOOD PRESSURE: 89 MMHG | HEIGHT: 69 IN | HEART RATE: 58 BPM | SYSTOLIC BLOOD PRESSURE: 134 MMHG | WEIGHT: 171 LBS | OXYGEN SATURATION: 100 % | RESPIRATION RATE: 16 BRPM

## 2021-03-09 DIAGNOSIS — D35.02 ADRENAL ADENOMA, LEFT: ICD-10-CM

## 2021-03-09 DIAGNOSIS — R07.9 CHEST PAIN: ICD-10-CM

## 2021-03-09 DIAGNOSIS — R07.89 CHEST WALL PAIN: Primary | ICD-10-CM

## 2021-03-09 DIAGNOSIS — R07.89 OTHER CHEST PAIN: Primary | ICD-10-CM

## 2021-03-09 DIAGNOSIS — R06.02 SHORTNESS OF BREATH: ICD-10-CM

## 2021-03-09 LAB
ALBUMIN SERPL-MCNC: 3.8 G/DL (ref 3.3–5.5)
ALP SERPL-CCNC: 76 U/L (ref 42–141)
AMPHET+METHAMPHET UR QL: NEGATIVE
BARBITURATES UR QL SCN>200 NG/ML: NEGATIVE
BENZODIAZ UR QL SCN>200 NG/ML: NEGATIVE
BILIRUB SERPL-MCNC: 0.8 MG/DL (ref 0.2–1.6)
BILIRUBIN, POC UA: NEGATIVE
BLOOD, POC UA: ABNORMAL
BUN SERPL-MCNC: 16 MG/DL (ref 7–22)
BZE UR QL SCN: NEGATIVE
CALCIUM SERPL-MCNC: 9.1 MG/DL (ref 8–10.3)
CANNABINOIDS UR QL SCN: NORMAL
CHLORIDE SERPL-SCNC: 108 MMOL/L (ref 98–108)
CLARITY, POC UA: CLEAR
COLOR, POC UA: YELLOW
CREAT SERPL-MCNC: 0.5 MG/DL (ref 0.6–1.2)
CREAT UR-MCNC: 32.2 MG/DL (ref 23–375)
CTP QC/QA: YES
GLUCOSE SERPL-MCNC: 109 MG/DL (ref 73–118)
GLUCOSE, POC UA: NEGATIVE
KETONES, POC UA: NEGATIVE
LEUKOCYTE EST, POC UA: NEGATIVE
METHADONE UR QL SCN>300 NG/ML: NEGATIVE
NITRITE, POC UA: NEGATIVE
OPIATES UR QL SCN: NEGATIVE
PCP UR QL SCN>25 NG/ML: NEGATIVE
PH UR STRIP: 5 [PH]
POC ALT (SGPT): 31 U/L (ref 10–47)
POC AST (SGOT): 35 U/L (ref 11–38)
POC CARDIAC TROPONIN I: 0 NG/ML
POC PTINR: 1 (ref 0.9–1.2)
POC PTWBT: 12 SEC (ref 9.7–14.3)
POC TCO2: 26 MMOL/L (ref 18–33)
POTASSIUM BLD-SCNC: 4.1 MMOL/L (ref 3.6–5.1)
PROTEIN, POC UA: NEGATIVE
PROTEIN, POC: 7.3 G/DL (ref 6.4–8.1)
SAMPLE: NORMAL
SAMPLE: NORMAL
SARS-COV-2 RDRP RESP QL NAA+PROBE: NEGATIVE
SODIUM BLD-SCNC: 140 MMOL/L (ref 128–145)
SPECIFIC GRAVITY, POC UA: 1.01
TOXICOLOGY INFORMATION: NORMAL
UROBILINOGEN, POC UA: 0.2 E.U./DL

## 2021-03-09 PROCEDURE — 3008F PR BODY MASS INDEX (BMI) DOCUMENTED: ICD-10-PCS | Mod: CPTII,S$GLB,, | Performed by: NURSE PRACTITIONER

## 2021-03-09 PROCEDURE — 99214 OFFICE O/P EST MOD 30 MIN: CPT | Mod: S$GLB,,, | Performed by: NURSE PRACTITIONER

## 2021-03-09 PROCEDURE — 80053 COMPREHEN METABOLIC PANEL: CPT | Mod: ER

## 2021-03-09 PROCEDURE — 25000003 PHARM REV CODE 250: Mod: ER | Performed by: EMERGENCY MEDICINE

## 2021-03-09 PROCEDURE — U0002 COVID-19 LAB TEST NON-CDC: HCPCS | Mod: ER | Performed by: EMERGENCY MEDICINE

## 2021-03-09 PROCEDURE — 3008F BODY MASS INDEX DOCD: CPT | Mod: CPTII,S$GLB,, | Performed by: NURSE PRACTITIONER

## 2021-03-09 PROCEDURE — 99999 PR PBB SHADOW E&M-EST. PATIENT-LVL III: CPT | Mod: PBBFAC,,, | Performed by: NURSE PRACTITIONER

## 2021-03-09 PROCEDURE — 93005 ELECTROCARDIOGRAM TRACING: CPT | Mod: ER

## 2021-03-09 PROCEDURE — 99284 EMERGENCY DEPT VISIT MOD MDM: CPT | Mod: 25,ER

## 2021-03-09 PROCEDURE — 80307 DRUG TEST PRSMV CHEM ANLYZR: CPT | Performed by: EMERGENCY MEDICINE

## 2021-03-09 PROCEDURE — 99999 PR PBB SHADOW E&M-EST. PATIENT-LVL III: ICD-10-PCS | Mod: PBBFAC,,, | Performed by: NURSE PRACTITIONER

## 2021-03-09 PROCEDURE — 93010 EKG 12-LEAD: ICD-10-PCS | Mod: ,,, | Performed by: INTERNAL MEDICINE

## 2021-03-09 PROCEDURE — 1125F PR PAIN SEVERITY QUANTIFIED, PAIN PRESENT: ICD-10-PCS | Mod: S$GLB,,, | Performed by: NURSE PRACTITIONER

## 2021-03-09 PROCEDURE — 96374 THER/PROPH/DIAG INJ IV PUSH: CPT | Mod: ER,59

## 2021-03-09 PROCEDURE — 63600175 PHARM REV CODE 636 W HCPCS: Mod: ER | Performed by: EMERGENCY MEDICINE

## 2021-03-09 PROCEDURE — 85025 COMPLETE CBC W/AUTO DIFF WBC: CPT | Mod: ER

## 2021-03-09 PROCEDURE — 93010 ELECTROCARDIOGRAM REPORT: CPT | Mod: ,,, | Performed by: INTERNAL MEDICINE

## 2021-03-09 PROCEDURE — 99214 PR OFFICE/OUTPT VISIT, EST, LEVL IV, 30-39 MIN: ICD-10-PCS | Mod: S$GLB,,, | Performed by: NURSE PRACTITIONER

## 2021-03-09 PROCEDURE — 1125F AMNT PAIN NOTED PAIN PRSNT: CPT | Mod: S$GLB,,, | Performed by: NURSE PRACTITIONER

## 2021-03-09 PROCEDURE — 25500020 PHARM REV CODE 255: Mod: ER | Performed by: EMERGENCY MEDICINE

## 2021-03-09 PROCEDURE — 85610 PROTHROMBIN TIME: CPT | Mod: ER

## 2021-03-09 PROCEDURE — 81003 URINALYSIS AUTO W/O SCOPE: CPT | Mod: ER,59

## 2021-03-09 PROCEDURE — 84484 ASSAY OF TROPONIN QUANT: CPT | Mod: ER

## 2021-03-09 RX ORDER — DICLOFENAC SODIUM 10 MG/G
2 GEL TOPICAL 4 TIMES DAILY PRN
Qty: 1 TUBE | Refills: 0 | Status: SHIPPED | OUTPATIENT
Start: 2021-03-09 | End: 2021-03-19

## 2021-03-09 RX ORDER — ASPIRIN 325 MG
325 TABLET ORAL
Status: COMPLETED | OUTPATIENT
Start: 2021-03-09 | End: 2021-03-09

## 2021-03-09 RX ORDER — ASPIRIN 81 MG/1
81 TABLET ORAL 2 TIMES DAILY
Qty: 60 TABLET | Refills: 0 | Status: SHIPPED | OUTPATIENT
Start: 2021-03-09 | End: 2021-04-08

## 2021-03-09 RX ORDER — ACETAMINOPHEN 500 MG
1000 TABLET ORAL EVERY 6 HOURS PRN
Qty: 30 TABLET | Refills: 0 | Status: SHIPPED | OUTPATIENT
Start: 2021-03-09

## 2021-03-09 RX ORDER — IBUPROFEN 600 MG/1
600 TABLET ORAL EVERY 6 HOURS PRN
Qty: 20 TABLET | Refills: 0 | Status: SHIPPED | OUTPATIENT
Start: 2021-03-09

## 2021-03-09 RX ORDER — KETOROLAC TROMETHAMINE 30 MG/ML
15 INJECTION, SOLUTION INTRAMUSCULAR; INTRAVENOUS
Status: COMPLETED | OUTPATIENT
Start: 2021-03-09 | End: 2021-03-09

## 2021-03-09 RX ORDER — CYCLOBENZAPRINE HCL 10 MG
10 TABLET ORAL
Status: COMPLETED | OUTPATIENT
Start: 2021-03-09 | End: 2021-03-09

## 2021-03-09 RX ORDER — CYCLOBENZAPRINE HCL 10 MG
10 TABLET ORAL 3 TIMES DAILY PRN
Qty: 15 TABLET | Refills: 0 | Status: SHIPPED | OUTPATIENT
Start: 2021-03-09 | End: 2021-03-14

## 2021-03-09 RX ADMIN — IOHEXOL 100 ML: 350 INJECTION, SOLUTION INTRAVENOUS at 11:03

## 2021-03-09 RX ADMIN — KETOROLAC TROMETHAMINE 15 MG: 30 INJECTION, SOLUTION INTRAMUSCULAR; INTRAVENOUS at 10:03

## 2021-03-09 RX ADMIN — CYCLOBENZAPRINE HYDROCHLORIDE 10 MG: 10 TABLET, FILM COATED ORAL at 10:03

## 2021-03-09 RX ADMIN — ASPIRIN 325 MG ORAL TABLET 325 MG: 325 PILL ORAL at 10:03

## 2021-03-12 PROBLEM — R07.9 CHEST PAIN AT REST: Status: ACTIVE | Noted: 2021-03-12

## 2021-03-12 PROBLEM — F12.90 MARIJUANA USE: Status: ACTIVE | Noted: 2021-03-12

## 2021-03-13 ENCOUNTER — PATIENT OUTREACH (OUTPATIENT)
Dept: ADMINISTRATIVE | Facility: OTHER | Age: 46
End: 2021-03-13

## 2021-03-15 ENCOUNTER — OFFICE VISIT (OUTPATIENT)
Dept: CARDIOLOGY | Facility: CLINIC | Age: 46
End: 2021-03-15
Payer: COMMERCIAL

## 2021-03-15 VITALS
BODY MASS INDEX: 25.37 KG/M2 | HEIGHT: 69 IN | DIASTOLIC BLOOD PRESSURE: 99 MMHG | OXYGEN SATURATION: 99 % | SYSTOLIC BLOOD PRESSURE: 141 MMHG | HEART RATE: 70 BPM | WEIGHT: 171.31 LBS

## 2021-03-15 DIAGNOSIS — R07.9 CHEST PAIN: ICD-10-CM

## 2021-03-15 DIAGNOSIS — R07.9 CHEST PAIN AT REST: Primary | ICD-10-CM

## 2021-03-15 DIAGNOSIS — Z72.0 TOBACCO ABUSE: ICD-10-CM

## 2021-03-15 PROCEDURE — 3008F PR BODY MASS INDEX (BMI) DOCUMENTED: ICD-10-PCS | Mod: CPTII,S$GLB,, | Performed by: INTERNAL MEDICINE

## 2021-03-15 PROCEDURE — 99999 PR PBB SHADOW E&M-EST. PATIENT-LVL IV: CPT | Mod: PBBFAC,,, | Performed by: INTERNAL MEDICINE

## 2021-03-15 PROCEDURE — 3008F BODY MASS INDEX DOCD: CPT | Mod: CPTII,S$GLB,, | Performed by: INTERNAL MEDICINE

## 2021-03-15 PROCEDURE — 99203 OFFICE O/P NEW LOW 30 MIN: CPT | Mod: S$GLB,,, | Performed by: INTERNAL MEDICINE

## 2021-03-15 PROCEDURE — 99203 PR OFFICE/OUTPT VISIT, NEW, LEVL III, 30-44 MIN: ICD-10-PCS | Mod: S$GLB,,, | Performed by: INTERNAL MEDICINE

## 2021-03-15 PROCEDURE — 99999 PR PBB SHADOW E&M-EST. PATIENT-LVL IV: ICD-10-PCS | Mod: PBBFAC,,, | Performed by: INTERNAL MEDICINE

## 2021-03-15 PROCEDURE — 1126F PR PAIN SEVERITY QUANTIFIED, NO PAIN PRESENT: ICD-10-PCS | Mod: S$GLB,,, | Performed by: INTERNAL MEDICINE

## 2021-03-15 PROCEDURE — 1126F AMNT PAIN NOTED NONE PRSNT: CPT | Mod: S$GLB,,, | Performed by: INTERNAL MEDICINE

## 2021-03-15 RX ORDER — CYCLOBENZAPRINE HCL 5 MG
5 TABLET ORAL 3 TIMES DAILY PRN
COMMUNITY

## 2021-03-16 ENCOUNTER — OFFICE VISIT (OUTPATIENT)
Dept: FAMILY MEDICINE | Facility: CLINIC | Age: 46
End: 2021-03-16
Payer: COMMERCIAL

## 2021-03-16 VITALS
SYSTOLIC BLOOD PRESSURE: 126 MMHG | BODY MASS INDEX: 25.8 KG/M2 | RESPIRATION RATE: 19 BRPM | TEMPERATURE: 99 F | HEART RATE: 92 BPM | WEIGHT: 174.19 LBS | OXYGEN SATURATION: 97 % | DIASTOLIC BLOOD PRESSURE: 84 MMHG | HEIGHT: 69 IN

## 2021-03-16 DIAGNOSIS — E27.8 ADRENAL INCIDENTALOMA: Primary | ICD-10-CM

## 2021-03-16 DIAGNOSIS — Z11.4 SCREENING FOR HIV (HUMAN IMMUNODEFICIENCY VIRUS): ICD-10-CM

## 2021-03-16 DIAGNOSIS — R07.9 CHEST PAIN, UNSPECIFIED TYPE: ICD-10-CM

## 2021-03-16 DIAGNOSIS — R31.9 HEMATURIA, UNSPECIFIED TYPE: ICD-10-CM

## 2021-03-16 PROCEDURE — 99214 PR OFFICE/OUTPT VISIT, EST, LEVL IV, 30-39 MIN: ICD-10-PCS | Mod: S$GLB,,, | Performed by: NURSE PRACTITIONER

## 2021-03-16 PROCEDURE — 3008F BODY MASS INDEX DOCD: CPT | Mod: CPTII,S$GLB,, | Performed by: NURSE PRACTITIONER

## 2021-03-16 PROCEDURE — 99999 PR PBB SHADOW E&M-EST. PATIENT-LVL IV: CPT | Mod: PBBFAC,,, | Performed by: NURSE PRACTITIONER

## 2021-03-16 PROCEDURE — 3008F PR BODY MASS INDEX (BMI) DOCUMENTED: ICD-10-PCS | Mod: CPTII,S$GLB,, | Performed by: NURSE PRACTITIONER

## 2021-03-16 PROCEDURE — 99999 PR PBB SHADOW E&M-EST. PATIENT-LVL IV: ICD-10-PCS | Mod: PBBFAC,,, | Performed by: NURSE PRACTITIONER

## 2021-03-16 PROCEDURE — 81003 URINALYSIS AUTO W/O SCOPE: CPT | Performed by: NURSE PRACTITIONER

## 2021-03-16 PROCEDURE — 1126F AMNT PAIN NOTED NONE PRSNT: CPT | Mod: S$GLB,,, | Performed by: NURSE PRACTITIONER

## 2021-03-16 PROCEDURE — 1126F PR PAIN SEVERITY QUANTIFIED, NO PAIN PRESENT: ICD-10-PCS | Mod: S$GLB,,, | Performed by: NURSE PRACTITIONER

## 2021-03-16 PROCEDURE — 99214 OFFICE O/P EST MOD 30 MIN: CPT | Mod: S$GLB,,, | Performed by: NURSE PRACTITIONER

## 2021-03-17 ENCOUNTER — HOSPITAL ENCOUNTER (OUTPATIENT)
Dept: CARDIOLOGY | Facility: HOSPITAL | Age: 46
Discharge: HOME | End: 2021-03-17
Attending: INTERNAL MEDICINE
Payer: COMMERCIAL

## 2021-03-17 VITALS — WEIGHT: 171 LBS | HEIGHT: 69 IN | BODY MASS INDEX: 25.33 KG/M2

## 2021-03-17 DIAGNOSIS — R07.9 CHEST PAIN AT REST: ICD-10-CM

## 2021-03-17 LAB
BILIRUB UR QL STRIP: NEGATIVE
CLARITY UR: CLEAR
COLOR UR: YELLOW
CV STRESS BASE HR: 75 BPM
DIASTOLIC BLOOD PRESSURE: 93 MMHG
GLUCOSE UR QL STRIP: NEGATIVE
HGB UR QL STRIP: NEGATIVE
KETONES UR QL STRIP: NEGATIVE
LEUKOCYTE ESTERASE UR QL STRIP: NEGATIVE
NITRITE UR QL STRIP: NEGATIVE
OHS CV CPX 1 MINUTE RECOVERY HEART RATE: 142 BPM
OHS CV CPX 85 PERCENT MAX PREDICTED HEART RATE MALE: 149
OHS CV CPX ESTIMATED METS: 13
OHS CV CPX MAX PREDICTED HEART RATE: 175
OHS CV CPX PATIENT IS FEMALE: 0
OHS CV CPX PATIENT IS MALE: 1
OHS CV CPX PEAK DIASTOLIC BLOOD PRESSURE: 95 MMHG
OHS CV CPX PEAK HEAR RATE: 157 BPM
OHS CV CPX PEAK RATE PRESSURE PRODUCT: NORMAL
OHS CV CPX PEAK SYSTOLIC BLOOD PRESSURE: 218 MMHG
OHS CV CPX PERCENT MAX PREDICTED HEART RATE ACHIEVED: 90
OHS CV CPX RATE PRESSURE PRODUCT PRESENTING: 9750
PH UR STRIP: 5 [PH] (ref 5–8)
PROT UR QL STRIP: NEGATIVE
SP GR UR STRIP: 1.01 (ref 1–1.03)
STRESS ECHO POST EXERCISE DUR MIN: 8 MINUTES
STRESS ECHO POST EXERCISE DUR SEC: 0 SECONDS
STRESS ST DEPRESSION: 0.5 MM
SYSTOLIC BLOOD PRESSURE: 130 MMHG
URN SPEC COLLECT METH UR: NORMAL
UROBILINOGEN UR STRIP-ACNC: NEGATIVE EU/DL

## 2021-03-17 PROCEDURE — 93018 EXERCISE STRESS - EKG (CUPID ONLY): ICD-10-PCS | Mod: ,,, | Performed by: INTERNAL MEDICINE

## 2021-03-17 PROCEDURE — 93018 CV STRESS TEST I&R ONLY: CPT | Mod: ,,, | Performed by: INTERNAL MEDICINE

## 2021-03-17 PROCEDURE — 93016 CV STRESS TEST SUPVJ ONLY: CPT | Mod: ,,, | Performed by: INTERNAL MEDICINE

## 2021-03-17 PROCEDURE — 93016 EXERCISE STRESS - EKG (CUPID ONLY): ICD-10-PCS | Mod: ,,, | Performed by: INTERNAL MEDICINE

## 2021-03-17 PROCEDURE — 93017 CV STRESS TEST TRACING ONLY: CPT

## 2021-03-18 ENCOUNTER — TELEPHONE (OUTPATIENT)
Dept: FAMILY MEDICINE | Facility: CLINIC | Age: 46
End: 2021-03-18

## 2021-03-31 ENCOUNTER — OFFICE VISIT (OUTPATIENT)
Dept: ENDOCRINOLOGY | Facility: CLINIC | Age: 46
End: 2021-03-31
Payer: COMMERCIAL

## 2021-03-31 VITALS
TEMPERATURE: 99 F | WEIGHT: 173.13 LBS | SYSTOLIC BLOOD PRESSURE: 148 MMHG | DIASTOLIC BLOOD PRESSURE: 101 MMHG | BODY MASS INDEX: 25.56 KG/M2

## 2021-03-31 DIAGNOSIS — R03.0 ELEVATED BP WITHOUT DIAGNOSIS OF HYPERTENSION: ICD-10-CM

## 2021-03-31 DIAGNOSIS — E27.8 ADRENAL INCIDENTALOMA: Primary | ICD-10-CM

## 2021-03-31 PROCEDURE — 99204 PR OFFICE/OUTPT VISIT, NEW, LEVL IV, 45-59 MIN: ICD-10-PCS | Mod: S$GLB,,, | Performed by: HOSPITALIST

## 2021-03-31 PROCEDURE — 1126F PR PAIN SEVERITY QUANTIFIED, NO PAIN PRESENT: ICD-10-PCS | Mod: S$GLB,,, | Performed by: HOSPITALIST

## 2021-03-31 PROCEDURE — 3008F PR BODY MASS INDEX (BMI) DOCUMENTED: ICD-10-PCS | Mod: CPTII,S$GLB,, | Performed by: HOSPITALIST

## 2021-03-31 PROCEDURE — 99999 PR PBB SHADOW E&M-EST. PATIENT-LVL III: CPT | Mod: PBBFAC,,, | Performed by: HOSPITALIST

## 2021-03-31 PROCEDURE — 3008F BODY MASS INDEX DOCD: CPT | Mod: CPTII,S$GLB,, | Performed by: HOSPITALIST

## 2021-03-31 PROCEDURE — 99204 OFFICE O/P NEW MOD 45 MIN: CPT | Mod: S$GLB,,, | Performed by: HOSPITALIST

## 2021-03-31 PROCEDURE — 1126F AMNT PAIN NOTED NONE PRSNT: CPT | Mod: S$GLB,,, | Performed by: HOSPITALIST

## 2021-03-31 PROCEDURE — 99999 PR PBB SHADOW E&M-EST. PATIENT-LVL III: ICD-10-PCS | Mod: PBBFAC,,, | Performed by: HOSPITALIST

## 2021-03-31 RX ORDER — DEXAMETHASONE 1 MG/1
1 TABLET ORAL NIGHTLY
Qty: 1 TABLET | Refills: 0 | Status: SHIPPED | OUTPATIENT
Start: 2021-03-31 | End: 2021-04-30

## 2021-04-12 ENCOUNTER — LAB VISIT (OUTPATIENT)
Dept: LAB | Facility: HOSPITAL | Age: 46
End: 2021-04-12
Attending: HOSPITALIST
Payer: COMMERCIAL

## 2021-04-12 DIAGNOSIS — E27.8 ADRENAL INCIDENTALOMA: ICD-10-CM

## 2021-04-12 LAB
CORTIS SERPL-MCNC: 3 UG/DL (ref 4.3–22.4)
DHEA-S SERPL-MCNC: 149 UG/DL (ref 136.2–447.6)
ESTIMATED AVG GLUCOSE: 108 MG/DL (ref 68–131)
HBA1C MFR BLD: 5.4 % (ref 4–5.6)
TSH SERPL DL<=0.005 MIU/L-ACNC: 0.41 UIU/ML (ref 0.4–4)

## 2021-04-12 PROCEDURE — 82024 ASSAY OF ACTH: CPT | Performed by: HOSPITALIST

## 2021-04-12 PROCEDURE — 83835 ASSAY OF METANEPHRINES: CPT | Performed by: HOSPITALIST

## 2021-04-12 PROCEDURE — 83036 HEMOGLOBIN GLYCOSYLATED A1C: CPT | Performed by: HOSPITALIST

## 2021-04-12 PROCEDURE — 82088 ASSAY OF ALDOSTERONE: CPT | Performed by: HOSPITALIST

## 2021-04-12 PROCEDURE — 82627 DEHYDROEPIANDROSTERONE: CPT | Performed by: HOSPITALIST

## 2021-04-12 PROCEDURE — 82533 TOTAL CORTISOL: CPT | Performed by: HOSPITALIST

## 2021-04-12 PROCEDURE — 84244 ASSAY OF RENIN: CPT | Performed by: HOSPITALIST

## 2021-04-12 PROCEDURE — 36415 COLL VENOUS BLD VENIPUNCTURE: CPT | Mod: PN | Performed by: HOSPITALIST

## 2021-04-12 PROCEDURE — 84443 ASSAY THYROID STIM HORMONE: CPT | Performed by: HOSPITALIST

## 2021-04-13 LAB — ACTH PLAS-MCNC: <5 PG/ML (ref 0–46)

## 2021-04-14 LAB — ALDOST SERPL-MCNC: 10.6 NG/DL

## 2021-04-16 LAB — RENIN PLAS-CCNC: 4.2 NG/ML/H

## 2021-04-18 LAB
METANEPH FREE SERPL-MCNC: 87 PG/ML
METANEPHS SERPL-MCNC: 175 PG/ML
NORMETANEPH FREE SERPL-MCNC: 88 PG/ML

## 2021-04-22 ENCOUNTER — TELEPHONE (OUTPATIENT)
Dept: ENDOCRINOLOGY | Facility: CLINIC | Age: 46
End: 2021-04-22

## 2021-04-28 ENCOUNTER — PATIENT MESSAGE (OUTPATIENT)
Dept: RESEARCH | Facility: HOSPITAL | Age: 46
End: 2021-04-28

## 2024-12-07 NOTE — ANESTHESIA POSTPROCEDURE EVALUATION
Anesthesia Post Evaluation    Patient: Raoul Logan    Procedure(s) Performed: Procedure(s) (LRB):  EGD (ESOPHAGOGASTRODUODENOSCOPY) (N/A)  COLONOSCOPY (N/A)    Final Anesthesia Type: general  Patient location during evaluation: PACU  Patient participation: Yes- Able to Participate  Level of consciousness: awake and alert  Post-procedure vital signs: reviewed and stable  Pain management: adequate  Airway patency: patent  PONV status at discharge: No PONV  Anesthetic complications: no      Cardiovascular status: blood pressure returned to baseline  Respiratory status: spontaneous ventilation and room air  Hydration status: euvolemic  Follow-up not needed.          Vitals Value Taken Time   /75 7/1/2019  3:50 PM   Temp 36.8 °C (98.2 °F) 7/1/2019  3:30 PM   Pulse 66 7/1/2019  3:50 PM   Resp 16 7/1/2019  3:50 PM   SpO2 98 % 7/1/2019  3:40 PM         Event Time     Out of Recovery 16:00:23          Pain/Nikolay Score: Nikolay Score: 10 (7/1/2019  3:50 PM)         Pt signed back in after being D/C for leg pain, now has eye \"burning\" states it started after discharge for initial complaint, denies visual disturbances

## (undated) DEVICE — DRESSING TRANS 4X4 TEGADERM

## (undated) DEVICE — KIT PT CARE HANA PROFX SSXT

## (undated) DEVICE — ELECTRODE REM PLYHSV RETURN 9

## (undated) DEVICE — KIT IRR SUCTION HND PIECE

## (undated) DEVICE — TOWEL OR XRAY WHITE 17X26IN

## (undated) DEVICE — SUT MONOCRYL 3-0 PS-2 UND

## (undated) DEVICE — DRESSING TELFA STRL 4X3 LF

## (undated) DEVICE — DRAPE C ARM 42 X 120 10/BX

## (undated) DEVICE — MASK FLYTE HOOD PEEL AWAY

## (undated) DEVICE — SEE L#156916

## (undated) DEVICE — SYR 10CC LUER LOCK

## (undated) DEVICE — SUT 2/0 36IN COATED VICRYL

## (undated) DEVICE — SOL BETADINE 5%

## (undated) DEVICE — CONTAINER SPECIMEN STRL 4OZ

## (undated) DEVICE — TAPE SILK 3IN

## (undated) DEVICE — DRESSING AQUACEL AG RBBN 2X45

## (undated) DEVICE — CUP MEDICINE STERILE 2OZ

## (undated) DEVICE — SEE MEDLINE ITEM 157144

## (undated) DEVICE — SUT 1 36IN COATED VICRYL UN

## (undated) DEVICE — SUT MONOCYRL 4-0 PS2 UND

## (undated) DEVICE — NDL 18GA X1 1/2 REG BEVEL

## (undated) DEVICE — ADHESIVE DERMABOND ADVANCED

## (undated) DEVICE — BLADE SAGITTAL 18 X 1.27 X 90M

## (undated) DEVICE — DRAPE IOBAN 2 STERI

## (undated) DEVICE — MARKER SKIN STND TIP BLUE BARR

## (undated) DEVICE — GAUZE SPONGE 4X4 12PLY

## (undated) DEVICE — SEE MEDLINE ITEM 152529

## (undated) DEVICE — PACK DRAPE UNIVERSAL CONVERTOR

## (undated) DEVICE — NDL SPINAL 18GX3.5 SPINOCAN

## (undated) DEVICE — SEE MEDLINE ITEM 157131

## (undated) DEVICE — SYR 50CC LL